# Patient Record
Sex: MALE | Race: WHITE | Employment: UNEMPLOYED | URBAN - METROPOLITAN AREA
[De-identification: names, ages, dates, MRNs, and addresses within clinical notes are randomized per-mention and may not be internally consistent; named-entity substitution may affect disease eponyms.]

---

## 2020-07-02 ENCOUNTER — HOSPITAL ENCOUNTER (OUTPATIENT)
Dept: PHYSICAL THERAPY | Age: 60
Discharge: HOME | End: 2020-07-02
Payer: COMMERCIAL

## 2020-07-02 PROCEDURE — 97530 THERAPEUTIC ACTIVITIES: CPT

## 2020-07-02 PROCEDURE — 97140 MANUAL THERAPY 1/> REGIONS: CPT

## 2020-07-02 PROCEDURE — 97162 PT EVAL MOD COMPLEX 30 MIN: CPT

## 2020-07-02 NOTE — PROGRESS NOTES
PHYSICAL THERAPY - DAILY TREATMENT NOTE    Patient Name: Agusto Darnell        Date: 2020  : 1960   YES Patient  Verified  Visit #:     Insurance: Payor: Eddie Armendarizure / Plan: [facility] PPO / Product Type: PPO /      In time: 4:50 P Out time: 5:45 P   Total Treatment Time: 50     BCBS/Medicare Time Tracking (below)   Total Timed Codes (min):  40 1:1 Treatment Time:  40     TREATMENT AREA =  Right shoulder pain [M25.511]  SUBJECTIVE  Pain Level (on 0 to 10 scale):  8  / 10   Medication Changes/New allergies or changes in medical history, any new surgeries or procedures?     NO    If yes, update Summary List   Subjective Functional Status/Changes:  []  No changes reported     See POC           Modalities Rationale:     decrease inflammation to improve patient's ability to return to pain-free sleeping in bed   min [] Estim, type/location:                                      []  att     []  unatt     []  w/US     []  w/ice    []  w/heat    min []  Mechanical Traction: type/lbs                   []  pro   []  sup   []  int   []  cont    []  before manual    []  after manual    min []  Ultrasound, settings/location:      min []  Iontophoresis w/ dexamethasone, location:                                               []  take home patch       []  in clinic   10 min [x]  Ice     []  Heat    location/position: Seated to L R shoulder     min []  Vasopneumatic Device, press/temp:     min []  Other:    [] Skin assessment post-treatment (if applicable):    [x]  intact    [x]  redness- no adverse reaction     []redness  adverse reaction:        15 min Therapeutic Activity: [x]  See sheet   Rationale:    increase strength to improve the patients ability to return to independent dressing    10 min Manual Therapy: PROM/gentle  Manual stretching for flex/scaption, [email or web address] of ABD in supine, oscillations for pain control    Rationale:      decrease pain and increase ROM to improve patient's ability to return to pain-free lifting overhead once DC use of sling         Billed With/As:   [] TE   [] TA   [] Neuro   [] Self Care Patient Education: [x] Review HEP    [] Progressed/Changed HEP based on:   [] positioning   [] body mechanics   [] transfers   [] heat/ice application  [] other:      Other Objective/Functional Measures:    Pt & wife educated on don/doffing sling, use of pendulums & pain-control, hygiene     Post Treatment Pain Level (on 0 to 10) scale:   8  / 10     ASSESSMENT  Assessment/Changes in Function:     See POC     []  See Progress Note/Recertification   Patient will continue to benefit from skilled PT services to modify and progress therapeutic interventions, address functional mobility deficits, address ROM deficits, analyze and cue movement patterns, analyze and modify body mechanics/ergonomics, assess and modify postural abnormalities and instruct in home and community integration to attain remaining goals.    Progress toward goals / Updated goals:    Progressing towards goals established at [address] [facility] [address]  []  Upgrade activities as tolerated YES Continue plan of care   []  Discharge due to :    []  Other:      Therapist: Mariella Claude, PT    Date: 7/2/2020 Time: 6:02 PM     Future Appointments   Date Time Provider Rafy Arce   7/6/2020  2:45 PM Tran Rigo [facility]   7/9/2020  4:45 PM Mariella Claude, PT [facility]   7/13/2020  2:45 PM Mariella Claude, PT [facility]   7/20/2020  2:45 PM Tran Sierra [facility]   7/23/2020  3:15 PM Mariella Claude, PT [facility]   7/27/2020  2:45 PM Mariella Claude, PT [facility]   7/30/2020  3:15 PM Batool Sharma, PT [facility]

## 2020-07-06 ENCOUNTER — HOSPITAL ENCOUNTER (OUTPATIENT)
Dept: PHYSICAL THERAPY | Age: 60
Discharge: HOME | End: 2020-07-06
Payer: COMMERCIAL

## 2020-07-06 PROCEDURE — 97140 MANUAL THERAPY 1/> REGIONS: CPT

## 2020-07-06 PROCEDURE — 97110 THERAPEUTIC EXERCISES: CPT

## 2020-07-06 NOTE — PROGRESS NOTES
PHYSICAL THERAPY - DAILY TREATMENT NOTE    Patient Name: Jay Chris        Date: 2020  : 1960   YES Patient  Verified  Visit #:      12  Insurance: Payor: Adrián Paul / Plan: [facility] PPO / Product Type: PPO /      In time: 2:50 P Out time: 3:40 P   Total Treatment Time: 50     BCBS/Medicare Time Tracking (below)   Total Timed Codes (min):  40 1:1 Treatment Time:  40     TREATMENT AREA =  Right shoulder pain [M25.511]    SUBJECTIVE  Pain Level (on 0 to 10 scale):  3  / 10   Medication Changes/New allergies or changes in medical history, any new surgeries or procedures? NO    If yes, update Summary List   Subjective Functional Status/Changes:  []  No changes reported     Patient reports he feels much better today, he is only taking 1 pill during the day & 1 pill before sleeping to manage his pain.             Modalities Rationale:     decrease edema, decrease inflammation and decrease pain to improve patient's ability to return to pain-free sleeping in bed   min [] Estim, type/location:                                      []  att     []  unatt     []  w/US     []  w/ice    []  w/heat    min []  Mechanical Traction: type/lbs                   []  pro   []  sup   []  int   []  cont    []  before manual    []  after manual    min []  Ultrasound, settings/location:      min []  Iontophoresis w/ dexamethasone, location:                                               []  take home patch       []  in clinic   10 min [x]  Ice     []  Heat    location/position: Seated to R shoulder     min []  Vasopneumatic Device, press/temp:     min []  Other:    [] Skin assessment post-treatment (if applicable):    [x]  intact    []  redness- no adverse reaction     []redness  adverse reaction:        25 min Therapeutic Exercise:  [x]  See flow sheet   Rationale:      increase ROM and increase strength to improve the patients ability to return to light gripping using R UE     15 min Manual Therapy: PROM for flex/scaption, [email or web address] of ABD in supine    Rationale:      decrease pain and increase ROM to improve patient's ability to return to pain-free ADLs     Billed With/As:   [] TE   [] TA   [] Neuro   [] Self Care Patient Education: [x] Review HEP    [] Progressed/Changed HEP based on:   [] positioning   [] body mechanics   [] transfers   [] heat/ice application    [] other:      Other Objective/Functional Measures:    Initiated therapeutic exercise per flow sheet     Post Treatment Pain Level (on 0 to 10) scale:   2  / 10     ASSESSMENT  Assessment/Changes in Function:     Improved tolerance to manual therapy, less muscle guarding     []  See Progress Note/Recertification   Patient will continue to benefit from skilled PT services to modify and progress therapeutic interventions, address functional mobility deficits, address ROM deficits, address strength deficits, analyze and modify body mechanics/ergonomics, assess and modify postural abnormalities and instruct in home and community integration to attain remaining goals.    Progress toward goals / Updated goals:    Progressing towards goals established at [address] [facility] [address] [address]  []  Upgrade activities as tolerated YES Continue plan of care   []  Discharge due to :    []  Other:      Therapist: Marc Joseph PT    Date: 7/6/2020 Time: 4:18 PM     Future Appointments   Date Time Provider Rafy Arce   7/9/2020  4:45 PM Jess [facility]   7/13/2020  2:45 PM Alma Gasca, PT [facility]   7/20/2020  2:45 PM Jess [facility]   7/23/2020  3:15 PM Alma Gasca, PT [facility]   7/27/2020  2:45 PM Alma Gasca, PT [facility]   7/30/2020  3:15 PM Danilo Sharma, PT [facility]

## 2020-07-09 ENCOUNTER — HOSPITAL ENCOUNTER (OUTPATIENT)
Dept: PHYSICAL THERAPY | Age: 60
Discharge: HOME | End: 2020-07-09
Payer: COMMERCIAL

## 2020-07-09 PROCEDURE — 97140 MANUAL THERAPY 1/> REGIONS: CPT

## 2020-07-09 PROCEDURE — 97110 THERAPEUTIC EXERCISES: CPT

## 2020-07-09 NOTE — PROGRESS NOTES
PHYSICAL THERAPY - DAILY TREATMENT NOTE    Patient Name: Shira Jaffe        Date: 2020  : 1960   YES Patient  Verified  Visit #:   3   of   12  Insurance: Payor: Mg Kelly / Plan: [facility] PPO / Product Type: PPO /      In time: 4:45 P Out time: 5:40 P   Total Treatment Time: 50     BCBS/Medicare Time Tracking (below)   Total Timed Codes (min):  40 1:1 Treatment Time:  40     TREATMENT AREA =  Right shoulder pain [M25.511]    SUBJECTIVE  Pain Level (on 0 to 10 scale):  3  / 10   Medication Changes/New allergies or changes in medical history, any new surgeries or procedures? NO    If yes, update Summary List   Subjective Functional Status/Changes:  []  No changes reported     Patient reports his arm is sore he tried to push a door using his R arm but it was in the sling & he realized he should not do that.          Modalities Rationale:     decrease edema, decrease inflammation and decrease pain to improve patient's ability to return to pain-free ADLs    min [] Estim, type/location:                                      []  att     []  unatt     []  w/US     []  w/ice    []  w/heat    min []  Mechanical Traction: type/lbs                   []  pro   []  sup   []  int   []  cont    []  before manual    []  after manual    min []  Ultrasound, settings/location:      min []  Iontophoresis w/ dexamethasone, location:                                               []  take home patch       []  in clinic   10 min [x]  Ice     []  Heat    location/position: Seated to R shoulder     min []  Vasopneumatic Device, press/temp:     min []  Other:    [] Skin assessment post-treatment (if applicable):    [x]  intact    [x]  redness- no adverse reaction     []redness  adverse reaction:        25 min Therapeutic Exercise:  [x]  See flow sheet   Rationale:      increase ROM and increase strength to improve the patients ability to return to light gripping using R UE     15 min Manual Therapy: PROM for flex/scaption, [email or web address] of ABD in supine    Rationale:      decrease pain and increase ROM to improve patient's ability to return to pain-free ADLs     Billed With/As:   [] TE   [] TA   [] Neuro   [] Self Care Patient Education: [x] Review HEP    [] Progressed/Changed HEP based on:   [] positioning   [] body mechanics   [] transfers   [] heat/ice application    [] other:      Other Objective/Functional Measures:    PROM: flex/scaption: 130 in supine   Post Treatment Pain Level (on 0 to 10) scale:   2  / 10     ASSESSMENT  Assessment/Changes in Function:     Improved tolerance to manual therapy, specifically with ER today     []  See Progress Note/Recertification   Patient will continue to benefit from skilled PT services to modify and progress therapeutic interventions, address functional mobility deficits, address ROM deficits, address strength deficits, analyze and modify body mechanics/ergonomics, assess and modify postural abnormalities and instruct in home and community integration to attain remaining goals.    Progress toward goals / Updated goals:    Progressing towards STG 2, LTG 2     PLAN  []  Upgrade activities as tolerated YES Continue plan of care   []  Discharge due to :    []  Other:      Therapist: Javy Cheng, PT    Date: 7/9/2020 Time: 4:18 PM     Future Appointments   Date Time Provider Rafy Arce   7/13/2020  2:45 PM Rajesh [facility]   7/20/2020  2:45 PM Jorge Ness, PT [facility]   7/23/2020  3:15 PM Jorge Ness PT [facility]   7/27/2020  2:45 PM Jorge Ness PT [facility]   7/30/2020  3:15 PM Linda Sharma, PT [facility]

## 2020-07-13 ENCOUNTER — APPOINTMENT (OUTPATIENT)
Dept: PHYSICAL THERAPY | Age: 60
End: 2020-07-13
Payer: COMMERCIAL

## 2020-07-14 ENCOUNTER — HOSPITAL ENCOUNTER (OUTPATIENT)
Dept: PHYSICAL THERAPY | Age: 60
Discharge: HOME | End: 2020-07-14
Payer: COMMERCIAL

## 2020-07-14 PROCEDURE — 97110 THERAPEUTIC EXERCISES: CPT

## 2020-07-14 PROCEDURE — 97140 MANUAL THERAPY 1/> REGIONS: CPT

## 2020-07-15 ENCOUNTER — APPOINTMENT (OUTPATIENT)
Dept: PHYSICAL THERAPY | Age: 60
End: 2020-07-15
Payer: COMMERCIAL

## 2020-07-15 NOTE — PROGRESS NOTES
[address] [facility] PHYSICAL THERAPY AT [address] [place] [address]  Blair Cantu [facility], [address] [place] [phone], [address]  Phone: [phone]  Fax: [phone]  PROGRESS NOTE  Patient Name: Sisi Bowles : 1960   Treatment/Medical Diagnosis: Right shoulder pain [M25.511]   Referral Source: Lashawn Adamson*     Date of Initial Visit: 20 Attended Visits: 4 Missed Visits: 0     SUMMARY OF TREATMENT  Therapeutic exercise including ROM, stretching,  strengthening, c/s stretching, postural ed, patient education, HEP instruction, CP, manual therapy including PROM/oscillations for pain control to promote relaxation. .    CURRENT STATUS  Mr. Dom Russo has made good progress with R shoulder,he is currently 2 weeks post op with good compliance with activity restrictions/use of sling & attendance of PT f/u visits. PROM improvements as follows: flex/scaption: 135 degrees, [email or web address] of ABD at 35 degrees in supine, full pain-free PROM of elbow. Good tolerance to current program. Plan to DC use of sling at 4 weeks post op, 20 as tolerated. Goal/Measure of Progress Goal Met? 1.  Establish HEP to prevent further disability. Status at last Eval: NA Current Status: Good compliance with daily HEP yes   2. Patient will report decreased c/o pain to < or = 3-4/10 to facilitate sleeping in bed with manageable sx in R shoulder. Status at last Eval: Average pain 8/10  At worst 9/10   At best 2/10  Current Status: Average pain 2-3/10  At worst 5/10  At best 0/10 Partially met   3. Improve gross  to > or = 80 lbs so strength is available for eventual lifting. Status at last Eval: 74 lbs Current Status: 95 lbs, pain-free yes     New Goals to be achieved in __3-4__  weeks:  1. Improve FOTO score from 30 points to > or = 58 points indicating improved tolerance with ADLs in regards to R shoulder.    2.  Improve R shoulder AROM for flex/scaption to 120 degrees so ROM is available for overhead reaching. 3.  Improve overall strength of R shoulder to 3+/5 so strength is available for return to light lifting activities at work/home. 4.  Patient will report decreased c/o pain to < or = 1-2/10 to facilitate sleeping at night uninterrupted with manageable sx in R shoulder. RECOMMENDATIONS  Patient to continue with PT for up to 3-4 more weeks in order to progress towards achieving all LTGs. If you have any questions/comments please contact us directly at [phone]. Thank you for allowing us to assist in the care of your patient. Therapist Signature: JOE Bolton, cert MDT Date: 6/89/1574     Time: 4:47 AM   NOTE TO PHYSICIAN:  PLEASE COMPLETE THE ORDERS BELOW AND FAX TO   [facility]: [phone]. If you are unable to process this request in 24 hours please contact our office: [phone]      ___ I have read the above report and request that my patient continue as recommended.   ___ I have read the above report and request that my patient continue therapy with the following changes/special instructions:_________________________________________________________   ___ I have read the above report and request that my patient be discharged from therapy.      Physician Signature:        Date:       Time:

## 2020-07-15 NOTE — PROGRESS NOTES
PHYSICAL THERAPY - DAILY TREATMENT NOTE    Patient Name: Dona Amor        Date: 2020  : 1960   YES Patient  Verified  Visit #:     Insurance: Payor: Mary Jane Nathan / Plan: [facility] PPO / Product Type: PPO /      In time: 4 P Out time: 4:55 P   Total Treatment Time: 50     BCBS/Medicare Time Tracking (below)   Total Timed Codes (min):  40 1:1 Treatment Time:  40     TREATMENT AREA =  Right shoulder pain [M25.511]    SUBJECTIVE  Pain Level (on 0 to 10 scale):  2  / 10   Medication Changes/New allergies or changes in medical history, any new surgeries or procedures?     NO    If yes, update Summary List   Subjective Functional Status/Changes:  []  No changes reported     See progress note to MD           Modalities Rationale:     decrease edema, decrease inflammation and decrease pain to improve patient's ability to return to pain-free use of R shoulder   min [] Estim, type/location:                                      []  att     []  unatt     []  w/US     []  w/ice    []  w/heat    min []  Mechanical Traction: type/lbs                   []  pro   []  sup   []  int   []  cont    []  before manual    []  after manual    min []  Ultrasound, settings/location:      min []  Iontophoresis w/ dexamethasone, location:                                               []  take home patch       []  in clinic   10 min [x]  Ice     []  Heat    location/position: Seated to R shoulder    min []  Vasopneumatic Device, press/temp:     min []  Other:    [] Skin assessment post-treatment (if applicable):    [x]  intact    [x]  redness- no adverse reaction     []redness  adverse reaction:        25 min Therapeutic Exercise:  [x]  See flow sheet   Rationale:      increase strength to improve the patients ability to return to eventual lifting     15 min Manual Therapy: PROM for flex/scaption, [email or web address] of ABD in supine   Rationale:      decrease pain and increase ROM to improve patient's ability to return to pain-free use of R shoulder once DC use of sling    Billed With/As:   [] TE   [] TA   [] Neuro   [] Self Care Patient Education: [x] Review HEP    [] Progressed/Changed HEP based on:   [] positioning   [] body mechanics   [] transfers   [] heat/ice application    [] other:      Other Objective/Functional Measures:    Gross  on R 95 lbs   PROM: flex/scaption 135 deg in supine, [email or web address] of ABD: 35 degrees     Post Treatment Pain Level (on 0 to 10) scale:   1  / 10     ASSESSMENT  Assessment/Changes in Function:     Good progress with PROM improvements as indicated above      []  See Progress Note/Recertification   Patient will continue to benefit from skilled PT services to modify and progress therapeutic interventions, address functional mobility deficits, address ROM deficits, analyze and modify body mechanics/ergonomics, assess and modify postural abnormalities and instruct in home and community integration to attain remaining goals.    Progress toward goals / Updated goals:    Progressing towards LTG s & STG 1, 3 met, 2 partially met     PLAN  []  Upgrade activities as tolerated YES Continue plan of care   []  Discharge due to :    [x]  Other: Progress note to MD     Therapist: Alexandra Gamino, PT    Date: 7/14/2020 Time: 4:55 PM     Future Appointments   Date Time Provider Rafy Arce   7/20/2020  2:45 [facility]   7/23/2020  3:15 PM Yamilet Crespo, PT [facility]   7/27/2020  2:45 PM Yamilet Crespo, PT [facility]   7/30/2020  3:15 PM Roberta Sharma, PT [facility]

## 2020-07-20 ENCOUNTER — HOSPITAL ENCOUNTER (OUTPATIENT)
Dept: PHYSICAL THERAPY | Age: 60
Discharge: HOME | End: 2020-07-20
Payer: COMMERCIAL

## 2020-07-20 PROCEDURE — 97140 MANUAL THERAPY 1/> REGIONS: CPT

## 2020-07-20 PROCEDURE — 97110 THERAPEUTIC EXERCISES: CPT

## 2020-07-20 NOTE — PROGRESS NOTES
[facility] PHYSICAL THERAPY - DAILY TREATMENT NOTE    Patient Name: Sisi Bowles        Date: 2020  : 1960   YES Patient  Verified  Visit #:   2   of   12  Insurance: Payor: Enrike Caban / Plan: [facility] PPO / Product Type: PPO /      In time: 2:50 P Out time: 3:40 P   Total Treatment Time: 50     BCBS/Medicare Time Tracking (below)   Total Timed Codes (min):  40 1:1 Treatment Time:  40     TREATMENT AREA =  Right shoulder pain [M25.511]    SUBJECTIVE  Pain Level (on 0 to 10 scale):  3  / 10   Medication Changes/New allergies or changes in medical history, any new surgeries or procedures? NO    If yes, update Summary List   Subjective Functional Status/Changes:  []  No changes reported     Patient reports that he is doing well, no problems over the weekend.             Modalities Rationale:     decrease edema, decrease inflammation and decrease pain to improve patient's ability to return to pain-free lifting    min [] Estim, type/location:                                      []  att     []  unatt     []  w/US     []  w/ice    []  w/heat    min []  Mechanical Traction: type/lbs                   []  pro   []  sup   []  int   []  cont    []  before manual    []  after manual    min []  Ultrasound, settings/location:      min []  Iontophoresis w/ dexamethasone, location:                                               []  take home patch       []  in clinic   10 min [x]  Ice     []  Heat    location/position: Seated to L shoulder     min []  Vasopneumatic Device, press/temp:     min []  Other:    [] Skin assessment post-treatment (if applicable):    [x]  intact    [x]  redness- no adverse reaction     []redness  adverse reaction:        25 min Therapeutic Exercise:  [x]  See flow sheet   Rationale:      increase ROM and increase strength to improve the patients ability to return to eventual lifting     15 min Manual Therapy: PROM/gentle manual stretch for flex/scaption in supine Rationale:      decrease pain and increase ROM to improve patient's ability to return to overhead reaching    Billed With/As:   [] TE   [] TA   [] Neuro   [] Self Care Patient Education: [x] Review HEP    [] Progressed/Changed HEP based on:   [] positioning   [] body mechanics   [] transfers   [] heat/ice application    [] other:      Other Objective/Functional Measures:    Progressed to green gripper today     Post Treatment Pain level (on 0 to 10) scale:   0  / 10     ASSESSMENT  Assessment/Changes in Function:     Good tolerance to today's strength progressions & manual therapy      []  See Progress Note/Recertification   Patient will continue to benefit from skilled PT services to modify and progress therapeutic interventions, address functional mobility deficits, address ROM deficits, address strength deficits, analyze and modify body mechanics/ergonomics, assess and modify postural abnormalities and instruct in home and community integration to attain remaining goals.    Progress toward goals / Updated goals:    Progressing towards newly established LTGs     PLAN  []  Upgrade activities as tolerated YES Continue plan of care   []  Discharge due to :    []  Other:      Therapist: Yossi Gerber PT    Date: 7/20/2020 Time: 3:51 PM     Future Appointments   Date Time Provider Rafy Arce   7/23/2020  3:15 [facility]   7/27/2020  2:45 PM Prince Ho, PT [facility]   7/30/2020  3:15 PM Erma Sharma, PT [facility]

## 2020-07-23 ENCOUNTER — HOSPITAL ENCOUNTER (OUTPATIENT)
Dept: PHYSICAL THERAPY | Age: 60
Discharge: HOME | End: 2020-07-23
Payer: COMMERCIAL

## 2020-07-23 PROCEDURE — 97140 MANUAL THERAPY 1/> REGIONS: CPT

## 2020-07-23 PROCEDURE — 97110 THERAPEUTIC EXERCISES: CPT

## 2020-07-23 NOTE — PROGRESS NOTES
PHYSICAL THERAPY - DAILY TREATMENT NOTE    Patient Name: Josefina Pimentel        Date: 2020  : 1960   YES Patient  Verified  Visit #:     Insurance: Payor: Loura Kussmaul / Plan: [facility] PPO / Product Type: PPO /      In time: 3:15 P Out time: 4:15 P   Total Treatment Time: 55     BCBS/Medicare Time Tracking (below)   Total Timed Codes (min):  45 1:1 Treatment Time:  45     TREATMENT AREA =  Right shoulder pain [M25.511]    SUBJECTIVE  Pain Level (on 0 to 10 scale):  2-3  / 10   Medication Changes/New allergies or changes in medical history, any new surgeries or procedures? NO    If yes, update Summary List   Subjective Functional Status/Changes:  []  No changes reported     Patient reports this his R shoulder has been doing well, his  Lower back has been hurting less.             Modalities Rationale:     decrease edema, decrease inflammation and decrease pain to improve patient's ability to return to pain-free sleeping at night.    min [] Estim, type/location:                                      []  att     []  unatt     []  w/US     []  w/ice    []  w/heat    min []  Mechanical Traction: type/lbs                   []  pro   []  sup   []  int   []  cont    []  before manual    []  after manual    min []  Ultrasound, settings/location:      min []  Iontophoresis w/ dexamethasone, location:                                               []  take home patch       []  in clinic   10 min [x]  Ice     []  Heat    location/position: Seated to R shoulder     min []  Vasopneumatic Device, press/temp:     min []  Other:    [] Skin assessment post-treatment (if applicable):    [x]  intact    []  redness- no adverse reaction     []redness  adverse :        30 min Therapeutic Exercise:  [x]  See flow sheet   Rationale:      increase ROM and increase strength to improve the patients ability to return to pain-free use of R UE once DC use of sling     15 min Manual Therapy: Gentle PROM for flex/scaption, [email or web address] of ABD in supine   Rationale:      decrease pain and increase ROM to improve patient's ability to return to pain-free reaching overhead    Billed With/As:   [] TE   [] TA   [] Neuro   [] Self Care Patient Education: [x] Review HEP    [] Progressed/Changed HEP based on:   [] positioning   [] body mechanics   [] transfers   [] heat/ice application    [] other:      Other Objective/Functional Measures:    See flow sheet       Post Treatment Pain Level (on 0 to 10) scale:   2  / 10     ASSESSMENT  Assessment/Changes in Function:     Good tolerance to today's treatment, no increase in pain      []  See Progress Note/Recertification   Patient will continue to benefit from skilled PT services to modify and progress therapeutic interventions, address functional mobility deficits, address ROM deficits, address strength deficits, analyze and modify body mechanics/ergonomics, assess and modify postural abnormalities and instruct in home and community integration to attain remaining goals.    Progress toward goals / Updated goals:    Progressing towards LTG 2 & 4     PLAN  []  Upgrade activities as tolerated YES Continue plan of care   []  Discharge due to :    []  Other:      Therapist: Mike Munoz PT    Date: 7/23/2020 Time: 4:31 PM     Future Appointments   Date Time Provider Rafy Arce   7/27/2020  2:45 PM Rao Hardy, PT [facility] [place] [facility]   7/30/2020  3:15 PM Rao Hardy, PT [facility]   8/3/2020  3:15 PM Rao Hardy, PT [facility]   8/6/2020  3:15 PM Rao Hardy, PT [facility]   8/10/2020  3:15 PM Rao Hardy, PT [facility]   8/13/2020  3:15 PM Rao Hardy, PT [facility]   8/17/2020  3:15 PM Rao Hardy, PT [facility]   8/20/2020  3:15 PM Rao Hardy, PT [facility]   8/24/2020  3:15 PM Rao Hardy, PT [facility]   8/27/2020  3:15 PM Nina Sharma, PT [facility]

## 2020-07-27 ENCOUNTER — HOSPITAL ENCOUNTER (OUTPATIENT)
Dept: PHYSICAL THERAPY | Age: 60
Discharge: HOME | End: 2020-07-27
Payer: COMMERCIAL

## 2020-07-27 PROCEDURE — 97110 THERAPEUTIC EXERCISES: CPT

## 2020-07-27 PROCEDURE — 97140 MANUAL THERAPY 1/> REGIONS: CPT

## 2020-07-27 NOTE — PROGRESS NOTES
PHYSICAL THERAPY - DAILY TREATMENT NOTE    Patient Name: Chelle Davalos        Date: 2020  : 1960   YES Patient  Verified  Visit #:      12  Insurance: Payor: Adamarisne Sharri / Plan: Radha Jane [id number] PPO / Product Type: PPO /      In time: 2:45 P Out time: 3:40 P   Total Treatment Time: 55     BCBS/Medicare Time Tracking (below)   Total Timed Codes (min):  45 1:1 Treatment Time:  40     TREATMENT AREA =  Right shoulder pain [M25.511]    SUBJECTIVE  Pain Level (on 0 to 10 scale):  2  / 10   Medication Changes/New allergies or changes in medical history, any new surgeries or procedures? NO    If yes, update Summary List   Subjective Functional Status/Changes:  []  No changes reported     Patient reports he tried to lift his R arm when he was out of the sling and had some pain.           Modalities Rationale:     decrease edema, decrease inflammation and decrease pain to improve patient's ability to return to pain-free sleeping at night.    min [] Estim, type/location:                                      []  att     []  unatt     []  w/US     []  w/ice    []  w/heat    min []  Mechanical Traction: type/lbs                   []  pro   []  sup   []  int   []  cont    []  before manual    []  after manual    min []  Ultrasound, settings/location:      min []  Iontophoresis w/ dexamethasone, location:                                               []  take home patch       []  in clinic   10 min [x]  Ice     []  Heat    location/position: Seated to R shoulder     min []  Vasopneumatic Device, press/temp:     min []  Other:    [] Skin assessment post-treatment (if applicable):    [x]  intact    [x]  redness- no adverse reaction     []redness  adverse :        30/  25 min Therapeutic Exercise:  [x]  See flow sheet   Rationale:      increase ROM and increase strength to improve the patients ability to return to pain-free use of R UE for lifting    15 min Manual Therapy: Gentle PROM for flex/scaption, Palila@[employer] of ABD in supine   Rationale:      decrease pain and increase ROM to improve patient's ability to return to pain-free dressing    Billed With/As:   [] TE   [] TA   [] Neuro   [] Self Care Patient Education: [x] Review HEP    [] Progressed/Changed HEP based on:   [] positioning   [] body mechanics   [] transfers   [] heat/ice application    [] other:      Other Objective/Functional Measures:    Patient 4 weeks post op on 7-28-20, patient educated on DC use of sling tomorrow       Post Treatment Pain Level (on 0 to 10) scale:   2  / 10     ASSESSMENT  Assessment/Changes in Function:     Good tolerance to today's treatment, no increase in pain with manual therapy     []  See Progress Note/Recertification   Patient will continue to benefit from skilled PT services to modify and progress therapeutic interventions, address functional mobility deficits, address ROM deficits, address strength deficits, analyze and modify body mechanics/ergonomics, assess and modify postural abnormalities and instruct in home and community integration to attain remaining goals.    Progress toward goals / Updated goals:    Progressing towards LTG 1, 2     PLAN  []  Upgrade activities as tolerated YES Continue plan of care   []  Discharge due to :    []  Other:      Therapist: Abby Real PT    Date: 7/27/2020 Time: 3:30 PM     Future Appointments   Date Time Provider Rafy Arce   7/30/2020  3:15 PM Axel Pop [facility]   8/3/2020  3:15 PM Vida Gone, PT [facility]   8/6/2020  3:15 PM Vida Gone, PT [facility]   8/10/2020  3:15 PM Vida Gone, PT [facility]   8/13/2020  3:15 PM Vida Gone, PT [facility]   8/17/2020  3:15 PM Vida Gone, PT [facility]   8/20/2020  3:15 PM Vida Gone, PT [facility]   8/24/2020  3:15 PM Vida Alvarez, PT [facility]   8/27/2020  3:15 PM Martell Sharma, PT [facility]

## 2020-07-30 ENCOUNTER — HOSPITAL ENCOUNTER (OUTPATIENT)
Dept: PHYSICAL THERAPY | Age: 60
Discharge: HOME | End: 2020-07-30
Payer: COMMERCIAL

## 2020-07-30 PROCEDURE — 97110 THERAPEUTIC EXERCISES: CPT

## 2020-07-30 PROCEDURE — 97140 MANUAL THERAPY 1/> REGIONS: CPT

## 2020-07-30 NOTE — PROGRESS NOTES
PHYSICAL THERAPY - DAILY TREATMENT NOTE    Patient Name: Chelle Davalos        Date: 2020  : 1960   YES Patient  Verified  Visit #:     Insurance: Payor: Linda Harrell / Plan: Radha Jane [id number] PPO / Product Type: PPO /      In time: 3:15 P Out time: 4:20 P   Total Treatment Time: 60     BCBS/Medicare Time Tracking (below)   Total Timed Codes (min):  50 1:1 Treatment Time:  45     TREATMENT AREA =  Right shoulder pain [M25.511]    SUBJECTIVE  Pain Level (on 0 to 10 scale):  1-2  / 10   Medication Changes/New allergies or changes in medical history, any new surgeries or procedures? NO    If yes, update Summary List   Subjective Functional Status/Changes:  []  No changes reported     Patient reports he DC use of sling yesterday & did well, he is trying to use his R arm more.             Modalities Rationale:     decrease edema, decrease inflammation and decrease pain to improve patient's ability to return to pain-free lifting using R UE   min [] Estim, type/location:                                      []  att     []  unatt     []  w/US     []  w/ice    []  w/heat    min []  Mechanical Traction: type/lbs                   []  pro   []  sup   []  int   []  cont    []  before manual    []  after manual    min []  Ultrasound, settings/location:      min []  Iontophoresis w/ dexamethasone, location:                                               []  take home patch       []  in clinic   10 min [x]  Ice     []  Heat    location/position: Seated to R shoulder     min []  Vasopneumatic Device, press/temp:     min []  Other:    [] Skin assessment post-treatment (if applicable):    [x]  intact    []  redness- no adverse reaction     []redness  adverse reaction:        40/  30 min Therapeutic Exercise:  [x]  See flow sheet   Rationale:      increase ROM and increase strength to improve the patients ability to return to independent dressing using R UE     10 min Manual Therapy: PROM/gentle manual stretching for flex/scaption, ER in supine at 75 of ABD   Rationale:      decrease pain and increase ROM to improve patient's ability to   Return to overhead reaching    Billed With/As:   [] TE   [] TA   [] Neuro   [] Self Care Patient Education: [x] Review HEP    [] Progressed/Changed HEP based on:   [] positioning   [] body mechanics   [] transfers   [] heat/ice application    [] other:      Other Objective/Functional Measures:    Progressed to AROM/AAROM per flow sheet, see updated HEP     Post Treatment Pain Level (on 0 to 10) scale:   1-2  / 10     ASSESSMENT  Assessment/Changes in Function:     Good tolerance with progression to AROM, improved tolerance to posterior capsule stretch in slight elevation in supine      []  See Progress Note/Recertification   Patient will continue to benefit from skilled PT services to modify and progress therapeutic interventions, address functional mobility deficits, address ROM deficits, address strength deficits, assess and modify postural abnormalities and instruct in home and community integration to attain remaining goals.    Progress toward goals / Updated goals:    Progressing towards LTG 2     PLAN  []  Upgrade activities as tolerated YES Continue plan of care   []  Discharge due to :    []  Other:      Therapist: Marc Joseph PT    Date: 7/30/2020 Time: 4:09 PM     Future Appointments   Date Time Provider Rafy Arce   8/3/2020  3:15 PM Jess Valle [facility] [place] [facility]   8/6/2020  3:15 PM Alma Gasca, PT [facility]   8/10/2020  3:15 PM Alma Gasca, PT [facility]   8/13/2020  3:15 PM Alma Alvarezing, PT [facility]   8/17/2020  3:15 PM Alma Alvarezing, PT [facility]   8/20/2020  3:15 PM Alma Alvarezing, PT [facility]   8/24/2020  3:15 PM Alma Gasca, PT [facility]   8/27/2020  3:15 PM Danilo Sharma, PT [facility]

## 2020-08-03 ENCOUNTER — HOSPITAL ENCOUNTER (OUTPATIENT)
Dept: PHYSICAL THERAPY | Age: 60
Discharge: HOME | End: 2020-08-03
Payer: COMMERCIAL

## 2020-08-03 PROCEDURE — 97140 MANUAL THERAPY 1/> REGIONS: CPT

## 2020-08-03 PROCEDURE — 97110 THERAPEUTIC EXERCISES: CPT

## 2020-08-03 NOTE — PROGRESS NOTES
PHYSICAL THERAPY - DAILY TREATMENT NOTE    Patient Name: Autumn Zhang        Date: 8/3/2020  : 1960   YES Patient  Verified  Visit #:     Insurance: Payor: Pk Dimas / Plan: Radha Jane [id number] PPO / Product Type: PPO /      In time: 3:15 P Out time: 4:25 P   Total Treatment Time: 65     BCBS/Medicare Time Tracking (below)   Total Timed Codes (min):  55 1:1 Treatment Time:  55     TREATMENT AREA =  Right shoulder pain [M25.511]    SUBJECTIVE  Pain Level (on 0 to 10 scale):  2  / 10   Medication Changes/New allergies or changes in medical history, any new surgeries or procedures? NO    If yes, update Summary List   Subjective Functional Status/Changes:  []  No changes reported     Patient reports he was sore after last visit, but not in pain today.             Modalities Rationale:     decrease edema, decrease inflammation and decrease pain to improve patient's ability to return to pain-free use of R shoulder with ADLs    min [] Estim, type/location:                                      []  att     []  unatt     []  w/US     []  w/ice    []  w/heat    min []  Mechanical Traction: type/lbs                   []  pro   []  sup   []  int   []  cont    []  before manual    []  after manual    min []  Ultrasound, settings/location:      min []  Iontophoresis w/ dexamethasone, location:                                               []  take home patch       []  in clinic   10 min [x]  Ice     []  Heat    location/position: Seated to R shoulder     min []  Vasopneumatic Device, press/temp:     min []  Other:    [] Skin assessment post-treatment (if applicable):    [x]  intact    [x]  redness- no adverse reaction     []redness  adverse reaction:        45 min Therapeutic Exercise:  [x]  See flow sheet   Rationale:      increase ROM and increase strength to improve the patients ability to return to pain-free lifting     10 min Manual Therapy: Gentle manual therapy including flex/scaption, [email or web address] of ABD in supine   Rationale:      decrease pain and increase ROM to improve patient's ability to return to pain-free reaching overhead    Billed With/As:   [] TE   [] TA   [] Neuro   [] Self Care Patient Education: [x] Review HEP    [] Progressed/Changed HEP based on:   [] positioning   [] body mechanics   [] transfers   [] heat/ice application    [] other:      Other Objective/Functional Measures:    See flow sheet, added sleeper stretch in R S/L today   Scaption: 108 degrees in standing      Post Treatment Pain Level (on 0 to 10) scale:   2  / 10     ASSESSMENT  Assessment/Changes in Function:     V/c & tactile cues to promote relaxation during manual therapy today      []  See Progress Note/Recertification   Patient will continue to benefit from skilled PT services to modify and progress therapeutic interventions, address functional mobility deficits, address ROM deficits, address strength deficits, analyze and modify body mechanics/ergonomics, assess and modify postural abnormalities and instruct in home and community integration to attain remaining goals.    Progress toward goals / Updated goals:    Progressing towards LTG 2      PLAN  []  Upgrade activities as tolerated YES Continue plan of care   []  Discharge due to :    []  Other:      Therapist: Ralf Jang, PT    Date: 8/3/2020 Time: 5:34 PM     Future Appointments   Date Time Provider Rafy Arce   8/6/2020  3:15 PM Dee [facility] [place] [facility]   8/10/2020  3:15 PM Abel Lang, PT [facility]   8/13/2020  3:15 PM Abel Rumps, PT [facility]   8/17/2020  3:15 PM Abel Rumps, PT [facility]   8/20/2020  3:15 PM Abel Rumps, PT [facility]   8/24/2020  3:15 PM Abel Rumps, PT [facility]   8/27/2020  3:15 PM Ricky Sharma, PT [facility]

## 2020-08-06 ENCOUNTER — APPOINTMENT (OUTPATIENT)
Dept: PHYSICAL THERAPY | Age: 60
End: 2020-08-06
Payer: COMMERCIAL

## 2020-08-10 ENCOUNTER — HOSPITAL ENCOUNTER (OUTPATIENT)
Dept: PHYSICAL THERAPY | Age: 60
Discharge: HOME | End: 2020-08-10
Payer: COMMERCIAL

## 2020-08-10 PROCEDURE — 97110 THERAPEUTIC EXERCISES: CPT

## 2020-08-10 PROCEDURE — 97140 MANUAL THERAPY 1/> REGIONS: CPT

## 2020-08-10 NOTE — PROGRESS NOTES
PHYSICAL THERAPY - DAILY TREATMENT NOTE    Patient Name: Anup Saldana        Date: 8/10/2020  : 1960   YES Patient  Verified  Visit #:     Insurance: Payor: Vicky Paz / Plan: Radha Jane [id number] PPO / Product Type: PPO /      In time: 3:10 P Out time: 4:05 P   Total Treatment Time: 50     BCBS/Medicare Time Tracking (below)   Total Timed Codes (min):  50 1:1 Treatment Time:  40     TREATMENT AREA = Right shoulder pain [M25.511]    SUBJECTIVE  Pain Level (on 0 to 10 scale):  4  / 10   Medication Changes/New allergies or changes in medical history, any new surgeries or procedures?     NO    If yes, update Summary List   Subjective Functional Status/Changes:  []  No changes reported     See progress note to MD           Modalities Rationale:     decrease edema, decrease inflammation and decrease pain to improve patient's ability to return to pain-free lifting using to R UE   min [] Estim, type/location:                                      []  att     []  unatt     []  w/US     []  w/ice    []  w/heat    min []  Mechanical Traction: type/lbs                   []  pro   []  sup   []  int   []  cont    []  before manual    []  after manual    min []  Ultrasound, settings/location:      min []  Iontophoresis w/ dexamethasone, location:                                               []  take home patch       []  in clinic   10 min [x]  Ice     []  Heat    location/position: Seated to R shoulder     min []  Vasopneumatic Device, press/temp:     min []  Other:    [] Skin assessment post-treatment (if applicable):    [x]  intact    []  redness- no adverse reaction     []redness  adverse reaction:        25 min Therapeutic Exercise:  [x]  See flow sheet   Rationale:      increase ROM and increase strength to improve the patients ability to return to pain-free lifting     15 min Manual Therapy: Gentle PROM/manual stretch for flex/scaption, ER in supine   Rationale:      decrease pain, increase ROM and increase tissue extensibility to improve patient's ability to return to pain-free reaching overhead      Billed With/As:   [] TE   [] TA   [] Neuro   [] Self Care Patient Education: [x] Review HEP    [] Progressed/Changed HEP based on:   [] positioning   [] body mechanics   [] transfers   [] heat/ice application    [] other:      Other Objective/Functional Measures:    Patient unable to elevate R UE > ~ 100 degrees today  Modified therapeutic exercise per flow sheet    Post Treatment Pain Level (on 0 to 10) scale:   2  / 10     ASSESSMENT  Assessment/Changes in Function:     No increase in pain with PROM, although painful with AROM/AAROM     []  See Progress Note/Recertification   Patient will continue to benefit from skilled PT services to modify and progress therapeutic interventions, address functional mobility deficits, address ROM deficits, address strength deficits, analyze and modify body mechanics/ergonomics, assess and modify postural abnormalities, address imbalance/dizziness and instruct in home and community integration to attain remaining goals.    Progress toward goals / Updated goals:    Progressing towards LTGs      PLAN  []  Upgrade activities as tolerated YES Continue plan of care   []  Discharge due to :    [x]  Other: See progress note to MD, f/u with MD on 8/12/20     Therapist: Mike Munoz PT    Date: 8/10/2020 Time: 4:16 PM     Future Appointments   Date Time Provider Rafy Arce   8/13/2020  3:15 PM Rao Hardy, PT [facility]   8/17/2020  3:15 PM Rao Hardy, PT [facility]   8/20/2020  3:15 PM Julianne Root [facility]   8/24/2020  3:15 PM Rao Hardy, PT [facility]   8/27/2020  3:15 PM Nina Sharma, PT [facility]

## 2020-08-10 NOTE — PROGRESS NOTES
[address] [facility] PHYSICAL THERAPY AT [address] [place] [address]  [facility], [address] ,[address], [address]  Phone: [phone]  Fax: [phone]  PROGRESS NOTE  Patient Name: Rohit Hicks : 1960   Treatment/Medical Diagnosis: Right shoulder pain [M25.511]   Referral Source: Tay Olivarez*     Date of Initial Visit: 20 Attended Visits: 10 Missed Visits: 1     SUMMARY OF TREATMENT  Therapeutic exercise including ROM, stretching,  strengthening, c/s stretching, postural ed, patient education, HEP instruction, CP, manual therapy including PROM/oscillations for pain control to promote relaxation. .    CURRENT STATUS  Mr. Ezra Navarro was making good progress with current treatment & had reported intermittent pain with average pain level 1-2/10. Today, he presents with increased pain levels after lifting a chair to stack onto a set of chairs with immediate onset of anterior shoulder pain with some radiating pain into upper arm. This pain was not present prior to today's treatment. Prior to today he was managing his pain with tylenol prn & reported primary c/o at night when attempting to find position of comfort. Goal/Measure of Progress Goal Met? 1. Improve R shoulder AROM for flex/scaption to 120 degrees so ROM is available for overhead reaching. Status at last Eval: Not assessed Current Status: 108 degrees  progressing   2. Patient will report decreased c/o pain to < or = 1-2/10 to facilitate sleeping at night uninterrupted with manageable sx in R shoulder. Status at last Eval: Average pain 2-3/10  At worst 5/10  At best 0/10 Current Status: Increased pain level today, 7/10 progressing   3. Improve FOTO score from 30 points to > or = 58 points indicating improved tolerance with ADLs in regards to R shoulder. Status at last Eval: 30 Current Status: To be assessed  progressing     New Goals to be achieved in __3-4__  weeks:  1.   Improve FOTO score from 30 points to > or = 58 points indicating improved tolerance with ADLs in regards to R shoulder. 2.  Improve R shoulder AROM for flex/scaption to 120 degrees so ROM is available for overhead reaching. 3.  Improve overall strength of R shoulder to 3+/5 so strength is available for return to light lifting activities at work/home. 4.  Patient will report decreased c/o pain to < or = 1-2/10 to facilitate sleeping at night uninterrupted with manageable sx in R shoulder. RECOMMENDATIONS  Patient to continue with PT for up to 3-4 more weeks in order to progress towards achieving all LTGs. Please indicate your recommendation regarding recent increase in pain & any changes to current treatment. If you have any questions/comments please contact us directly at [phone]. Thank you for allowing us to assist in the care of your patient. Therapist Signature: JOE Chery, cert MDT Date: 9/09/7845     Time: 3:10 PM   NOTE TO PHYSICIAN:  PLEASE COMPLETE THE ORDERS BELOW AND FAX TO   [facility] [facility]: [phone]. If you are unable to process this request in 24 hours please contact our office: [phone]      ___ I have read the above report and request that my patient continue as recommended.   ___ I have read the above report and request that my patient continue therapy with the following changes/special instructions:_________________________________________________________   ___ I have read the above report and request that my patient be discharged from therapy.      Physician Signature:        Date:       Time:

## 2020-08-13 ENCOUNTER — HOSPITAL ENCOUNTER (OUTPATIENT)
Dept: PHYSICAL THERAPY | Age: 60
Discharge: HOME | End: 2020-08-13
Payer: COMMERCIAL

## 2020-08-13 PROCEDURE — 97140 MANUAL THERAPY 1/> REGIONS: CPT

## 2020-08-13 PROCEDURE — 97110 THERAPEUTIC EXERCISES: CPT

## 2020-08-13 NOTE — PROGRESS NOTES
PHYSICAL THERAPY - DAILY TREATMENT NOTE    Patient Name: Sisi Bowles        Date: 2020   : 1960   YES Patient  Verified  Visit #:   10  of   12  Insurance: Payor: Enrike Caban / Plan: [facility] PPO / Product Type: PPO /      In time: 3:15 P Out time: 4:15 P   Total Treatment Time: 55     BCBS/Medicare Time Tracking (below)   Total Timed Codes (min):  45 1:1 Treatment Time:  45     TREATMENT AREA = Right shoulder pain [M25.511]    SUBJECTIVE  Pain Level (on 0 to 10 scale):  3  / 10   Medication Changes/New allergies or changes in medical history, any new surgeries or procedures? YES  If yes, update Summary List   Subjective Functional Status/Changes:  []  No changes reported     Patient reports f/u with MD went well, they checked out his arm & assured him he has not had any injury to his shoulder, they gave him mobic (1x/day) for the next 2 weeks to help with the inflammation.            Modalities Rationale:     decrease edema, decrease inflammation and decrease pain to improve patient's ability to return to pain-free lifting using R UE   min [] Estim, type/location:                                      []  att     []  unatt     []  w/US     []  w/ice    []  w/heat    min []  Mechanical Traction: type/lbs                   []  pro   []  sup   []  int   []  cont    []  before manual    []  after manual    min []  Ultrasound, settings/location:      min []  Iontophoresis w/ dexamethasone, location:                                               []  take home patch       []  in clinic   10 min [x]  Ice     []  Heat    location/position: Seated to R shoulder     min []  Vasopneumatic Device, press/temp:     min []  Other:    [] Skin assessment post-treatment (if applicable):    [x]  intact    []  redness- no adverse reaction     []redness  adverse reaction:        35 min Therapeutic Exercise:  [x]  See flow sheet   Rationale:      increase ROM and increase strength to improve the patients ability to return to pain-free reaching overhead     10 min Manual Therapy: Gentle manual stretch for flex/scaption, ER in supine   Rationale:      decrease pain and increase ROM to improve patient's ability to return to pain-free lifting      Billed With/As:   [] TE   [] TA   [] Neuro   [] Self Care Patient Education: [x] Review HEP    [] Progressed/Changed HEP based on:   [] positioning   [] body mechanics   [] transfers   [] heat/ice application    [] other:      Other Objective/Functional Measures:    Flex/scaption; 128 degrees in standing     Post Treatment Pain Level (on 0 to 10) scale:   2 / 10     ASSESSMENT  Assessment/Changes in Function:     Resumed all exercise per flow sheet, improved tolerance to all active movements today     []  See Progress Note/Recertification   Patient will continue to benefit from skilled PT services to modify and progress therapeutic interventions, address functional mobility deficits, address ROM deficits, address strength deficits, analyze and modify body mechanics/ergonomics, assess and modify postural abnormalities and instruct in home and community integration to attain remaining goals.    Progress toward goals / Updated goals:    Progressing towards newly established LTGs      PLAN  []  Upgrade activities as tolerated YES Continue plan of care   []  Discharge due to :    []  Other:      Therapist: Devin Pettit PT    Date: 8/13/2020 Time: 4:15 PM     Future Appointments   Date Time Provider Rafy Arce   8/17/2020  3:15 PM [facility]anner [facility]   8/20/2020  3:15 PM Gisel Cummings, PT [facility]   8/24/2020  3:15 PM Gisel Cummings PT [facility]   8/27/2020  3:15 PM Reshma Sharma, PT [facility]

## 2020-08-17 ENCOUNTER — HOSPITAL ENCOUNTER (OUTPATIENT)
Dept: PHYSICAL THERAPY | Age: 60
Discharge: HOME | End: 2020-08-17
Payer: COMMERCIAL

## 2020-08-17 PROCEDURE — 97140 MANUAL THERAPY 1/> REGIONS: CPT

## 2020-08-17 PROCEDURE — 97110 THERAPEUTIC EXERCISES: CPT

## 2020-08-17 NOTE — PROGRESS NOTES
PHYSICAL THERAPY - DAILY TREATMENT NOTE    Patient Name: Jay Chris        Date: 2020   : 1960   YES Patient  Verified  Visit #:     Insurance: Payor: Adrián Paul / Plan: [facility] PPO / Product Type: PPO /      In time: 3:15 P Out time: 4:10 P   Total Treatment Time: 50     BCBS/Medicare Time Tracking (below)   Total Timed Codes (min):  40 1:1 Treatment Time:  40     TREATMENT AREA = Right shoulder pain [M25.511]    SUBJECTIVE  Pain Level (on 0 to 10 scale):  2  / 10   Medication Changes/New allergies or changes in medical history, any new surgeries or procedures? YES  If yes, update Summary List   Subjective Functional Status/Changes:  []  No changes reported     Patient reports his arm is feeling better & he can now raise over his head with less pain.            Modalities Rationale:     decrease edema, decrease inflammation and decrease pain to improve patient's ability to return to pain-free lifting using R UE   min [] Estim, type/location:                                      []  att     []  unatt     []  w/US     []  w/ice    []  w/heat    min []  Mechanical Traction: type/lbs                   []  pro   []  sup   []  int   []  cont    []  before manual    []  after manual    min []  Ultrasound, settings/location:      min []  Iontophoresis w/ dexamethasone, location:                                               []  take home patch       []  in clinic   10 min [x]  Ice     []  Heat    location/position: Seated to R shoulder     min []  Vasopneumatic Device, press/temp:     min []  Other:    [] Skin assessment post-treatment (if applicable):    [x]  intact    []  redness- no adverse reaction     []redness  adverse reaction:        30 min Therapeutic Exercise:  [x]  See flow sheet   Rationale:      increase ROM and increase strength to improve the patients ability to return to pain-free lifting    10 min Manual Therapy: Gentle manual stretch for R shoulder flex/scaption, [email or web address] of ABD in supine   Rationale:      decrease pain and increase ROM to improve patient's ability to return to pain-free lifting      Billed With/As:   [] TE   [] TA   [] Neuro   [] Self Care Patient Education: [x] Review HEP    [] Progressed/Changed HEP based on:   [] positioning   [] body mechanics   [] transfers   [] heat/ice application    [] other:      Other Objective/Functional Measures: Added dowel ext & IR in standing today  7 weeks post op on 8/18/20     Post Treatment Pain Level (on 0 to 10) scale:   0  / 10     ASSESSMENT  Assessment/Changes in Function:     Resumed all exercise per flow sheet, improved tolerance to all active movements today     []  See Progress Note/Recertification   Patient will continue to benefit from skilled PT services to modify and progress therapeutic interventions, address functional mobility deficits, address ROM deficits, address strength deficits, analyze and modify body mechanics/ergonomics, assess and modify postural abnormalities and instruct in home and community integration to attain remaining goals.    Progress toward goals / Updated goals:    Progressing towards newly established LTGs      PLAN  []  Upgrade activities as tolerated YES Continue plan of care   []  Discharge due to :    []  Other:      Therapist: Kandi Grover, PT    Date: 8/17/2020 Time: 4:15 PM     Future Appointments   Date Time Provider Rafy Arce   8/20/2020  3:15 PM [facility]   8/24/2020  3:15 PM Nila Tomlinson, PT [facility]   8/27/2020  3:15 PM Eloisa Sharma, PT [facility]

## 2020-08-20 ENCOUNTER — HOSPITAL ENCOUNTER (OUTPATIENT)
Dept: PHYSICAL THERAPY | Age: 60
Discharge: HOME | End: 2020-08-20
Payer: COMMERCIAL

## 2020-08-20 PROCEDURE — 97140 MANUAL THERAPY 1/> REGIONS: CPT

## 2020-08-20 PROCEDURE — 97110 THERAPEUTIC EXERCISES: CPT

## 2020-08-20 NOTE — PROGRESS NOTES
PHYSICAL THERAPY - DAILY TREATMENT NOTE    Patient Name: Bernardo Basurto        Date: 2020  : 1960   YES Patient  Verified  Visit #:   15   of   18  Insurance: Payor: Nabil Acharya / Plan: [employer] [id number] PPO / Product Type: PPO /      In time: 3:15 P Out time: 4:10 P   Total Treatment Time: 50     BCBS/Medicare Time Tracking (below)   Total Timed Codes (min):  40 1:1 Treatment Time:  40     TREATMENT AREA = Right shoulder pain [M25.511]    SUBJECTIVE  Pain Level (on 0 to 10 scale):  4-5  / 10   Medication Changes/New allergies or changes in medical history, any new surgeries or procedures? NO    If yes, update Summary List   Subjective Functional Status/Changes:  []  No changes reported     Patient reports he noticed that he has a bruise down his upper arm, his arm is better overall but is still sore from the lifting incident.             Modalities Rationale:     decrease edema, decrease inflammation and decrease pain to improve patient's ability to return to pain-free lifting using R UE    min [] Estim, type/location:                                      []  att     []  unatt     []  w/US     []  w/ice    []  w/heat    min []  Mechanical Traction: type/lbs                   []  pro   []  sup   []  int   []  cont    []  before manual    []  after manual    min []  Ultrasound, settings/location:      min []  Iontophoresis w/ dexamethasone, location:                                               []  take home patch       []  in clinic   10 min [x]  Ice     []  Heat    location/position: Supine to l/s     min []  Vasopneumatic Device, press/temp:     min []  Other:    [] Skin assessment post-treatment (if applicable):    [x]  intact    []  redness- no adverse reaction     []redness  adverse reaction:        30 min Therapeutic Exercise:  [x]  See flow sheet   Rationale:      increase ROM and increase strength to improve the patients ability to return to pain-free reaching overhead using R UE      10 min Manual Therapy: Gentle manual stretch for flex/scaption, ER in supine   Rationale:      decrease pain and increase ROM to improve patient's ability to return to pain-free reaching into elevation for dressing      Billed With/As:   [] TE   [] TA   [] Neuro   [] Self Care Patient Education: [x] Review HEP    [] Progressed/Changed HEP based on:   [] positioning   [] body mechanics   [] transfers   [] heat/ice application    [] other:      Other Objective/Functional Measures:    Held IR stretch & posterior capsule stretch today due to pain      Post Treatment Pain Level (on 0 to 10) scale:   4-5  / 10     ASSESSMENT  Assessment/Changes in Function:     Mild c/o pain with end range self stretching as well ER with manual therapy     []  See Progress Note/Recertification   Patient will continue to benefit from skilled PT services to modify and progress therapeutic interventions, address functional mobility deficits, address ROM deficits, address strength deficits, analyze and modify body mechanics/ergonomics, assess and modify postural abnormalities and instruct in home and community integration to attain remaining goals.    Progress toward goals / Updated goals:    Progressing towards LTG 4     PLAN  []  Upgrade activities as tolerated YES Continue plan of care   []  Discharge due to :    []  Other:      Therapist: Leroy Latham PT    Date: 8/20/2020 Time: 4:39 PM     Future Appointments   Date Time Provider Rafy Arce   8/24/2020  3:15 PM [facility]   8/27/2020  3:15 PM Logan Cox, PT [facility]   9/1/2020  3:30 PM Logan Cox, PT [facility]   9/3/2020  3:15 PM Logan Cox, PT [facility]   9/8/2020  3:30 PM Sumandie Hudsone, PT [facility]   9/10/2020  2:45 PM Sumandie Hudsone, PT [facility]   9/15/2020  3:30 PM Deedeee Hudsone, PT [facility]   9/17/2020  3:15 PM Logan Cox PT [facility]   9/22/2020  3:30 PM Ismael Sharma Remigio Mcburney, PT [facility]   9/24/2020  3:15 PM Treasure Romo, PT [facility]   9/29/2020  3:30 PM Treasure Romo, PT [facility]   10/1/2020  3:15 PM New Sharma, PT [facility]

## 2020-08-24 ENCOUNTER — HOSPITAL ENCOUNTER (OUTPATIENT)
Dept: PHYSICAL THERAPY | Age: 60
Discharge: HOME | End: 2020-08-24
Payer: COMMERCIAL

## 2020-08-24 PROCEDURE — 97140 MANUAL THERAPY 1/> REGIONS: CPT

## 2020-08-24 PROCEDURE — 97110 THERAPEUTIC EXERCISES: CPT

## 2020-08-24 NOTE — PROGRESS NOTES
PHYSICAL THERAPY - DAILY TREATMENT NOTE    Patient Name: Autumn Zhang        Date: 2020  : 1960   YES Patient  Verified  Visit #:   15   of   18  Insurance: Payor: Pk Dimas / Plan: Radha Jane [id number] PPO / Product Type: PPO /      In time: 3:15 P Out time: 4:18 P   Total Treatment Time: 60     BCBS/Medicare Time Tracking (below)   Total Timed Codes (min):  50 1:1 Treatment Time:  50     TREATMENT AREA =  Right shoulder pain [M25.511]    SUBJECTIVE  Pain Level (on 0 to 10 scale):  3  / 10   Medication Changes/New allergies or changes in medical history, any new surgeries or procedures? NO    If yes, update Summary List   Subjective Functional Status/Changes:  []  No changes reported     Patient reports that his biceps does not hurt as much, it is still sore but not as bad as the last few weeks.             Modalities Rationale:     decrease edema, decrease inflammation and decrease pain to improve patient's ability to return to pain-free use of R shoulder with ADLs    min [] Estim, type/location:                                      []  att     []  unatt     []  w/US     []  w/ice    []  w/heat    min []  Mechanical Traction: type/lbs                   []  pro   []  sup   []  int   []  cont    []  before manual    []  after manual    min []  Ultrasound, settings/location:      min []  Iontophoresis w/ dexamethasone, location:                                               []  take home patch       []  in clinic   10 min [x]  Ice     []  Heat    location/position: Seated to R shoulder     min []  Vasopneumatic Device, press/temp:     min []  Other:    [] Skin assessment post-treatment (if applicable):    [x]  intact    [x]  redness- no adverse reaction     []redness  adverse reaction:        35   min Therapeutic Exercise:  [x]  See flow sheet   Rationale:      increase ROM and increase strength to improve the patients ability to return to pain-free lifting using R UE     15 min Manual Therapy: Gentle manual stretch for [email or web address] of ABD, flexion/scaption in supine, oscillations to promote relaxation    Rationale:      decrease pain and increase ROM to improve patient's ability to return to pain-free reaching overhead for dressing    Billed With/As:   [] TE   [] TA   [] Neuro   [] Self Care Patient Education: [x] Review HEP    [] Progressed/Changed HEP based on:   [] positioning   [] body mechanics   [] transfers   [] heat/ice application    [] other:      Other Objective/Functional Measures: Added supine protraction, resuming standing elevation per flow sheet  8 weeks post op on 8-25-20   Post Treatment Pain Level (on 0 to 10) scale:   3  / 10     ASSESSMENT  Assessment/Changes in Function:     Improved tolerance to elevation with elbow flexed, c/o pain in plane of scapula with arm fully extended      []  See Progress Note/Recertification   Patient will continue to benefit from skilled PT services to modify and progress therapeutic interventions, address functional mobility deficits, address ROM deficits, address strength deficits, analyze and modify body mechanics/ergonomics, assess and modify postural abnormalities and instruct in home and community integration to attain remaining goals.    Progress toward goals / Updated goals:    Progressing towards LTG 3      PLAN  []  Upgrade activities as tolerated YES Continue plan of care   []  Discharge due to :    []  Other:      Therapist: Zulema López PT    Date: 8/24/2020 Time: 5:05 PM     Future Appointments   Date Time Provider Rafy Arce   8/27/2020  3:15 PM Patricia Paez [facility] [place] [facility]   9/1/2020  3:30 PM Giovana Raza PT [facility]   9/3/2020  3:15 PM Giovana Raza PT [facility]   9/8/2020  3:30 PM Giovana Raza PT [facility]   9/10/2020  2:45 PM Giovana Raza PT [facility]   9/15/2020  3:30 PM Giovana Raza PT [facility]   9/17/2020  3:15 PM Giovana Raza PT [facility]   9/22/2020 3:30 PM Julianne Root [facility] [place] [facility]   9/24/2020  3:15 PM Rao Hardy, PT [facility]   9/29/2020  3:30 PM Rao Hardy, PT [facility]   10/1/2020  3:15 PM Nina Sharma, PT [facility]

## 2020-08-27 ENCOUNTER — HOSPITAL ENCOUNTER (OUTPATIENT)
Dept: PHYSICAL THERAPY | Age: 60
Discharge: HOME | End: 2020-08-27
Payer: COMMERCIAL

## 2020-08-27 PROCEDURE — 97110 THERAPEUTIC EXERCISES: CPT

## 2020-08-27 PROCEDURE — 97140 MANUAL THERAPY 1/> REGIONS: CPT

## 2020-08-27 NOTE — PROGRESS NOTES
PHYSICAL THERAPY - DAILY TREATMENT NOTE    Patient Name: Danis Sethi        Date: 2020  : 1960   YES Patient  Verified  Visit #:   15   of   18  Insurance: Payor: Tequila Score / Plan: Radha Jane [id number] PPO / Product Type: PPO /      In time: 3:15 P Out time: 4:20 P   Total Treatment Time: 60     BCBS/Medicare Time Tracking (below)   Total Timed Codes (min):  60 1:1 Treatment Time:  50     TREATMENT AREA = Right shoulder pain [M25.511]    SUBJECTIVE  Pain Level (on 0 to 10 scale):  2  / 10   Medication Changes/New allergies or changes in medical history, any new surgeries or procedures? NO    If yes, update Summary List   Subjective Functional Status/Changes:  []  No changes reported     Patient reports his biceps has been feeling better.            Modalities Rationale:     decrease edema and decrease pain to improve patient's ability to return to pain-free lifting with R UE   min [] Estim, type/location:                                      []  att     []  unatt     []  w/US     []  w/ice    []  w/heat    min []  Mechanical Traction: type/lbs                   []  pro   []  sup   []  int   []  cont    []  before manual    []  after manual     min []  Ultrasound, settings/location:      min []  Iontophoresis w/ dexamethasone, location:                                               []  take home patch       []  in clinic   10 min [x]  Ice     []  Heat    location/position: Seated to R shoulder     min []  Vasopneumatic Device, press/temp:     min []  Other:    [] Skin assessment post-treatment (if applicable):    []  intact    []  redness- no adverse reaction     []redness  adverse reaction:        35 min Therapeutic Exercise:  [x]  See flow sheet   Rationale:      increase ROM and increase strength to improve the patients ability to return to pain-free lying in R S/L position to sleep     15 min Manual Therapy: Manual stretch for flex/scaption, [email or web address] of ABD in supine   Rationale: decrease pain and increase ROM to improve patient's ability to return to reaching overhead for dressing      Billed With/As:   [] TE   [] TA   [] Neuro   [] Self Care Patient Education: [x] Review HEP    [] Progressed/Changed HEP based on:   [] positioning   [] body mechanics   [] transfers   [] heat/ice application    [] other:      Other Objective/Functional Measures:    See flow sheet     Post Treatment Pain Level (on 0 to 10) scale:   2  / 10     ASSESSMENT  Assessment/Changes in Function:     Resumed all therapeutic exercise without complaints, good tolerance to current program      []  See Progress Note/Recertification   Patient will continue to benefit from skilled PT services to modify and progress therapeutic interventions, address functional mobility deficits, address ROM deficits, address strength deficits, analyze and modify body mechanics/ergonomics, assess and modify postural abnormalities and instruct in home and community integration to attain remaining goals.    Progress toward goals / Updated goals:    Progressing towards LTG 1 & 2     PLAN  []  Upgrade activities as tolerated YES Continue plan of care   []  Discharge due to :    []  Other:      Therapist: Abby Real PT    Date: 8/27/2020 Time: 4:49 PM     Future Appointments   Date Time Provider Rafy Arce   9/1/2020  3:30 PM Vida Gone, PT [facility]   9/3/2020  3:15 PM Vida Gone, PT [facility]   9/8/2020  3:30 PM Vida Gone, PT [facility]   9/10/2020  2:45 PM Vida Gone, PT [facility]   9/15/2020  3:30 PM Vida Gone, PT [facility]   9/17/2020  3:15 PM Vida Gone, PT [facility]   9/22/2020  3:30 PM Vida Gone, PT [facility]   9/24/2020  3:15 PM Vida Gone, PT [facility]   9/29/2020  3:30 PM Vida Gone, PT [facility]   10/1/2020  3:15 PM Martell Sharma, PT [facility]

## 2020-09-01 ENCOUNTER — HOSPITAL ENCOUNTER (OUTPATIENT)
Dept: PHYSICAL THERAPY | Age: 60
Discharge: HOME | End: 2020-09-01
Payer: COMMERCIAL

## 2020-09-01 PROCEDURE — 97110 THERAPEUTIC EXERCISES: CPT

## 2020-09-01 PROCEDURE — 97140 MANUAL THERAPY 1/> REGIONS: CPT

## 2020-09-01 NOTE — PROGRESS NOTES
PHYSICAL THERAPY - DAILY TREATMENT NOTE    Patient Name: Bernardo Basurto        Date: 2020  : 1960   YES Patient  Verified  Visit #:   15   of   18  Insurance: Payor: Nabil Acharya / Plan: Radha Jane [id number] PPO / Product Type: PPO /      In time: 3:30 P Out time: 4:37 P   Total Treatment Time: 60     BCBS/Medicare Time Tracking (below)   Total Timed Codes (min):  50 1:1 Treatment Time:  50     TREATMENT AREA = Right shoulder pain [M25.511]    SUBJECTIVE  Pain Level (on 0 to 10 scale):  2  / 10   Medication Changes/New allergies or changes in medical history, any new surgeries or procedures? NO    If yes, update Summary List   Subjective Functional Status/Changes:  []  No changes reported     Patient reports the pain in his biceps is gone most of the pain is in the back of his upper arm.             Modalities Rationale:     decrease inflammation and decrease pain to improve patient's ability to return to pain-free lifting using R UE   min [] Estim, type/location:                                      []  att     []  unatt     []  w/US     []  w/ice    []  w/heat    min []  Mechanical Traction: type/lbs                   []  pro   []  sup   []  int   []  cont    []  before manual    []  after manual    min []  Ultrasound, settings/location:      min []  Iontophoresis w/ dexamethasone, location:                                               []  take home patch       []  in clinic   10 min [x]  Ice     []  Heat    location/position: Seated to R shoulder     min []  Vasopneumatic Device, press/temp:     min []  Other:    [] Skin assessment post-treatment (if applicable):    [x]  intact    [x]  redness- no adverse reaction     []redness  adverse reaction:        50 min Therapeutic Exercise:  [x]  See flow sheet   Rationale:      increase ROM and increase strength to improve the patients ability to return to pain-free reaching overhead using R UE    Billed With/As:   [] TE   [] TA   [] Neuro   [] Self Care Patient Education: [x] Review HEP    [] Progressed/Changed HEP based on :   [] positioning   [] body mechanics   [] transfers   [] heat/ice application    [] other:      Other Objective/Functional Measures:    Progressed to PREs & theraband per flow sheet     Post Treatment Pain Level (on 0 to 10) scale:   2/ 10     ASSESSMENT  Assessment/Changes in Function:     Good tolerance to strength progressions today      []  See Progress Note/Recertification   Patient will continue to benefit from skilled PT services to modify and progress therapeutic interventions, address functional mobility deficits, address ROM deficits, address strength deficits, analyze and cue movement patterns, analyze and modify body mechanics/ergonomics, assess and modify postural abnormalities and instruct in home and community integration to attain remaining goals.    Progress toward goals / Updated goals:    Progressing towards LTG 3     PLAN  []  Upgrade activities as tolerated YES Continue plan of care   []  Discharge due to :    []  Other:      Therapist: Bryce Ascencio PT    Date: 9/1/2020 Time: 4:44 PM     Future Appointments   Date Time Provider Rafy Arce   9/3/2020  3:15 PM Aubrey Yeager, PT [facility]   9/8/2020  3:30 PM Aubrey Yeager, PT [facility]   9/10/2020  2:45 PM Aubrey Yeager, PT [facility]   9/15/2020  3:30 PM Aubrey Yeager, PT [facility]   9/17/2020  3:15 PM Aubrey Yeager, PT [facility]   9/22/2020  3:30 PM Aubrey Yeager, PT [facility]   9/24/2020  3:15 PM Aubrey Yeager, PT [facility]   9/29/2020  3:30 PM Aubrey Yeager, PT [facility]   10/1/2020  3:15 PM Nolberto Sharma, PT [facility]

## 2020-09-03 ENCOUNTER — HOSPITAL ENCOUNTER (OUTPATIENT)
Dept: PHYSICAL THERAPY | Age: 60
Discharge: HOME | End: 2020-09-03
Payer: COMMERCIAL

## 2020-09-03 PROCEDURE — 97140 MANUAL THERAPY 1/> REGIONS: CPT

## 2020-09-03 PROCEDURE — 97110 THERAPEUTIC EXERCISES: CPT

## 2020-09-03 NOTE — PROGRESS NOTES
PHYSICAL THERAPY - DAILY TREATMENT NOTE    Patient Name: Johanna Daniels        Date: 9/3/2020  : 1960   YES Patient  Verified  Visit #:     Insurance: Payor: Jin Armando / Plan: Radha Jane [id number] PPO / Product Type: PPO /      In time: 3:15 P Out time: 4:20 P   Total Treatment Time: 60     BCBS/Medicare Time Tracking (below)   Total Timed Codes (min):  50 1:1 Treatment Time:  50     TREATMENT AREA =  Right shoulder pain [M25.511]    SUBJECTIVE  Pain Level (on 0 to 10 scale):  2  / 10   Medication Changes/New allergies or changes in medical history, any new surgeries or procedures? NO    If yes, update Summary List   Subjective Functional Status/Changes:  []  No changes reported     Patient reports he is having some pain in his upper arm after last PT session.          Modalities Rationale:     decrease edema, decrease inflammation and decrease pain to improve patient's ability to return to pain-free lifting using R UE   min [] Estim, type/location:                                      []  att     []  unatt     []  w/US     []  w/ice    []  w/heat    min []  Mechanical Traction: type/lbs Sing                  []  pro   []  sup   []  int   []  cont    []  before manual    []  after manual    min []  Ultrasound, settings/location:      min []  Iontophoresis w/ dexamethasone, location:                                               []  take home patch       []  in clinic   10 min [x]  Ice     []  Heat    location/position: Seated to R shoulder    min []  Vasopneumatic Device, press/temp:     min []  Other:    [] Skin assessment post-treatment (if applicable):    [x]  intact    [x]  redness- no adverse reaction     []redness  adverse reaction:        35 min Therapeutic Exercise:  [x]  See flow sheet   Rationale:      increase ROM and increase strength to improve the patients ability to return to light lifting using R UE     15 min Manual Therapy: Gentle manual stretch for flex/scaption, [email or web address] of [email or web address] in supine   Rationale:      decrease pain and increase ROM to improve patient's ability to return to pain-free ROM with dressing    Billed With/As:   [] TE   [] TA   [] Neuro   [] Self Care Patient Education: [x] Review HEP    [] Progressed/Changed HEP based on:   [] positioning   [] body mechanics   [] transfers   [] heat/ice application    [] other:      Other Objective/Functional Measures:    Progressed to orange theraband with rows/IR today     Post Treatment Pain Level (on 0 to 10) scale:   2  / 10     ASSESSMENT  Assessment/Changes in Function:     Good tolerance to strength progressions today, no increase in pain      []  See Progress Note/Recertification   Patient will continue to benefit from skilled PT services to modify and progress therapeutic interventions, address functional mobility deficits, address ROM deficits, address strength deficits, analyze and modify body mechanics/ergonomics, assess and modify postural abnormalities and instruct in home and community integration to attain remaining goals.    Progress toward goals / Updated goals:    Progressing towards LTG 1, 2      PLAN  []  Upgrade activities as tolerated YES Continue plan of care   []  Discharge due to :    []  Other:      Therapist: Roberta Martinez, PT    Date: 9/3/2020 Time: 4:27 PM     Future Appointments   Date Time Provider Rafy Arce   9/8/2020  3:30 PM Claudia Lam [facility] [place] [facility]   9/10/2020  2:45 PM  Fell, PT [facility]   9/15/2020  3:30 PM  Fell, PT [facility]   9/17/2020  3:15 PM  Fell, PT [facility]   9/22/2020  3:30 PM  Fell, PT [facility]   9/24/2020  3:15 PM  Fell, PT [facility]   9/29/2020  3:30 PM  Fell, PT [facility]   10/1/2020  3:15 PM Tj Sharma Friend, PT [facility]

## 2020-09-08 ENCOUNTER — HOSPITAL ENCOUNTER (OUTPATIENT)
Dept: PHYSICAL THERAPY | Age: 60
Discharge: HOME | End: 2020-09-08
Payer: COMMERCIAL

## 2020-09-08 PROCEDURE — 97110 THERAPEUTIC EXERCISES: CPT

## 2020-09-08 PROCEDURE — 97140 MANUAL THERAPY 1/> REGIONS: CPT

## 2020-09-08 NOTE — PROGRESS NOTES
Due for yearly PHYSICAL THERAPY - DAILY TREATMENT NOTE    Patient Name: Autumn Zhang        Date: 2020  : 1960   YES Patient  Verified  Visit #:     Insurance: Payor: Pk Dimas / Plan: Radha Jane [id number] PPO / Product Type: PPO /      In time: 3:30 P Out time: 4:40 P   Total Treatment Time: 65     BCBS/Medicare Time Tracking (below)   Total Timed Codes (min):  55 1:1 Treatment Time:  45     TREATMENT AREA =  Right shoulder pain [M25.511]    SUBJECTIVE  Pain Level (on 0 to 10 scale):  3  / 10   Medication Changes/New allergies or changes in medical history, any new surgeries or procedures? NO    If yes, update Summary List   Subjective Functional Status/Changes:  []  No changes reported     Patient reports pain level at worst 7-8/10, 0/10 at best, pain in upper arm is consistent with reaching overhead.          Modalities Rationale:     decrease edema, decrease inflammation and decrease pain to improve patient's ability to return to pain-free lifting using R UE   min [] Estim, type/location:                                      []  att     []  unatt     []  w/US     []  w/ice    []  w/heat    min []  Mechanical Traction: type/lbs                   []  pro   []  sup   []  int   []  cont    []  before manual    []  after manual    min []  Ultrasound, settings/location:      min []  Iontophoresis w/ dexamethasone, location:                                               []  take home patch       []  in clinic   10 min [x]  Ice     []  Heat    location/position:     min []  Vasopneumatic Device, press/temp:     min []  Other:    [] Skin assessment post-treatment (if applicable):    []  intact    []  redness- no adverse reaction     []redness  adverse reaction:        45/  35 min Therapeutic Exercise:  [x]  See flow sheet   Rationale:      increase ROM and increase strength to improve the patients ability to return to light lifting using R UE     10 min Manual Therapy: Manual stretch for flex/scaption, [email or web address] of ABD in supine   Rationale:      decrease pain and increase ROM to improve patient's ability to return to pain-free overhead reaching with R UE    Billed With/As:   [] TE   [] TA   [] Neuro   [] Self Care Patient Education: [x] Review HEP    [] Progressed/Changed HEP based on:   [] positioning   [] body mechanics   [] transfers   [] heat/ice application    [] other:      Other Objective/Functional Measures:    Held PREs per flow sheet given c/o pain     Post Treatment Pain Level (on 0 to 10) scale:   3  / 10     ASSESSMENT  Assessment/Changes in Function:     C/o pain in capsular pattern of R shoulder, most notably with ER>flexion, fair tolerance to manual & self stretches     []  See Progress Note/Recertification   Patient will continue to benefit from skilled PT services to modify and progress therapeutic interventions, address functional mobility deficits, address ROM deficits, address strength deficits, analyze and modify body mechanics/ergonomics, assess and modify postural abnormalities and instruct in home and community integration to attain remaining goals.    Progress toward goals / Updated goals:    Progressing towards LTG 1 & 2     PLAN  []  Upgrade activities as tolerated YES Continue plan of care   []  Discharge due to :    [x]  Other: Progress note n/v     Therapist: Devin Pettit PT    Date: 9/8/2020 Time: 5:43 PM     Future Appointments   Date Time Provider Rafy Arce   9/10/2020  2:45 PM Tarri Bon, PT [facility]   9/15/2020  3:30 PM Tarri Bon, PT [facility]   9/17/2020  3:15 PM Tarri Bon, PT [facility]   9/22/2020  3:30 PM Tarri Bon, PT [facility]   9/24/2020  3:15 PM Tarri Bon, PT [facility]   9/29/2020  3:30 PM Tarri Bon, PT [facility]   10/1/2020  3:15 PM Reshma Sharma, PT [facility]

## 2020-09-10 ENCOUNTER — HOSPITAL ENCOUNTER (OUTPATIENT)
Dept: PHYSICAL THERAPY | Age: 60
Discharge: HOME | End: 2020-09-10
Payer: COMMERCIAL

## 2020-09-10 PROCEDURE — 97140 MANUAL THERAPY 1/> REGIONS: CPT

## 2020-09-10 PROCEDURE — 97110 THERAPEUTIC EXERCISES: CPT

## 2020-09-11 NOTE — PROGRESS NOTES
PHYSICAL THERAPY - DAILY TREATMENT NOTE    Patient Name: Rohit Hicks        Date: 9/10/2020  : 1960   YES Patient  Verified  Visit #:     Insurance: Payor: Dasha Henderson / Plan: Radha Jane [id number] PPO / Product Type: PPO /      In time: 2:45 P Out time: 3:45 P   Total Treatment Time: 55     BCBS/Medicare Time Tracking (below)   Total Timed Codes (min):  45 1:1 Treatment Time:  45     TREATMENT AREA = Right shoulder pain [M25.511]    SUBJECTIVE  Pain Level (on 0 to 10 scale):  3  / 10   Medication Changes/New allergies or changes in medical history, any new surgeries or procedures?     NO    If yes, update Summary List   Subjective Functional Status/Changes:  []  No changes reported     See progress note to MD           Modalities Rationale:     decrease edema, decrease inflammation and decrease pain to improve patient's ability to return to pain-free lifting   min [] Estim, type/location:                                      []  att     []  unatt     []  w/US     []  w/ice    []  w/heat    min []  Mechanical Traction: type/lbs                   []  pro   []  sup   []  int   []  cont    []  before manual    []  after manual    min []  Ultrasound, settings/location:      min []  Iontophoresis w/ dexamethasone, location:                                               []  take home patch       []  in clinic   10 min [x]  Ice     []  Heat    location/position: Seated to R shoulder    min []  Vasopneumatic Device, press/temp:     min []  Other:    [] Skin assessment post-treatment (if applicable):    [x]  intact    []  redness- no adverse reaction     []redness  adverse reaction:        30 min Therapeutic Exercise:  [x]  See flow sheet   Rationale:      increase ROM, increase strength and improve coordination to improve the patients ability to return to light lifting at home     Billed With/As:   [] TE   [] TA   [] Neuro   [] Self Care Patient Education: [x] Review HEP    [] Progressed/Changed HEP based on:   [] positioning   [] body mechanics   [] transfers   [] heat/ice application    [] other:      Other Objective/Functional Measures:    FOTO 51 points  PROM/l flex/scaption 155 deg, [email or web address] of ABD     Post Treatment Pain Level (on 0 to 10) scale:   2  / 10     ASSESSMENT  Assessment/Changes in Function:     Resumed post capsule, improved tolerance to self stretches today      []  See Progress Note/Recertification   Patient will continue to benefit from skilled PT services to  to attain remaining goals.    Progress toward goals / Updated goals:    Progressing towards LTG 1 & LTG 4 met     PLAN  []  Upgrade activities as tolerated YES Continue plan of care   []  Discharge due to :    [x]  Other: Progress note to MD     Therapist: Maryann Trejo PT    Date: 9/10/2020 Time: 10:34 PM     Future Appointments   Date Time Provider Rafy Arce   9/14/2020  3:30 PM Danne Babinski, PT [facility]   9/17/2020  3:15 PM Danne Babinski, PT [facility]   9/22/2020  3:30 PM Danne Babinski, PT [facility]   9/24/2020  3:15 PM Danne Babinski, PT [facility]   9/29/2020  3:30 PM Danne Babinski, PT [facility]   10/1/2020  3:15 PM Vasile Sharma, PT [facility]

## 2020-09-14 ENCOUNTER — HOSPITAL ENCOUNTER (OUTPATIENT)
Dept: PHYSICAL THERAPY | Age: 60
Discharge: HOME | End: 2020-09-14
Payer: COMMERCIAL

## 2020-09-14 PROCEDURE — 97110 THERAPEUTIC EXERCISES: CPT

## 2020-09-14 PROCEDURE — 97140 MANUAL THERAPY 1/> REGIONS: CPT

## 2020-09-14 NOTE — PROGRESS NOTES
PHYSICAL THERAPY - DAILY TREATMENT NOTE    Patient Name: Shira Jaffe        Date: 2020  : 1960   YES Patient  Verified  Visit #:     Insurance: Payor: Mg Kelly / Plan: Radha Jane [id number] PPO / Product Type: PPO /      In time: 3:30 P Out time: 4:40 P   Total Treatment Time: 65     BCBS/Medicare Time Tracking (below)   Total Timed Codes (min):  55 1:1 Treatment Time:  45     TREATMENT AREA =  Right shoulder pain [M25.511]    SUBJECTIVE  Pain Level (on 0 to 10 scale):  2  / 10   Medication Changes/New allergies or changes in medical history, any new surgeries or procedures? NO    If yes, update Summary List   Subjective Functional Status/Changes:  []  No changes reported     Patient reports no new complaints over the weekend, he still cannot reach behind his back.             Modalities Rationale:     decrease edema, decrease inflammation and decrease pain to improve patient's ability to return to pain-free lifting using R UE   min [] Estim, type/location:                                      []  att     []  unatt     []  w/US     []  w/ice    []  w/heat    min []  Mechanical Traction: type/lbs                   []  pro   []  sup   []  int   []  cont    []  before manual    []  after manual    min []  Ultrasound, settings/location:      min []  Iontophoresis w/ dexamethasone, location:                                               []  take home patch       []  in clinic   10 min [x]  Ice     []  Heat    location/position: Seated to R shoulder      min []  Vasopneumatic Device, press/temp:     min []  Other:    [] Skin assessment post-treatment (if applicable):    [x]  intact    []  redness- no adverse reaction     []redness  adverse reaction:        40/  30 min Therapeutic Exercise:  [x]  See flow sheet   Rationale:      increase ROM and increase strength to improve the patients ability to return to pain-free dressing     15 min Manual Therapy: Gentle manual stretch for ER/IR @ 90 of ABD, flex/scaption, oscillations for pain control    Rationale:      decrease pain to improve patient's ability to return to pain-free reaching overhead with R UE    Billed With/As:   [] TE   [] TA   [] Neuro   [] Self Care Patient Education: [x] Review HEP    [] Progressed/Changed HEP based on:   [] positioning   [] body mechanics   [] transfers   [] heat/ice application    [] other:      Other Objective/Functional Measures: Added sleeper stretch on R, dowel ext & IR in standing     Post Treatment Pain Level (on 0 to 10) scale:   2  / 10     ASSESSMENT  Assessment/Changes in Function:     Good tolerance with progression of ROM, posterior capsule test continues to be painfully limited      []  See Progress Note/Recertification   Patient will continue to benefit from skilled PT services to modify and progress therapeutic interventions and address ROM deficits to attain remaining goals.    Progress toward goals / Updated goals:    Progressing towards newly established LTGs      PLAN  []  Upgrade activities as tolerated YES Continue plan of care   []  Discharge due to :    []  Other:      Therapist: Junior Ocampo PT    Date: 9/14/2020 Time: 5:27 PM     Future Appointments   Date Time Provider Rafy Arce   9/17/2020  3:15 PM Leroy Bhatia, PT [facility]   9/22/2020  3:30 PM Leroy Bhatia, PT [facility]   9/24/2020  3:15 PM Leroy Bhatia, PT [facility]   9/29/2020  3:30 PM Leroy Bhatia, PT [facility]   10/1/2020  3:00 PM Arik Sharma, PT [facility]

## 2020-09-15 ENCOUNTER — APPOINTMENT (OUTPATIENT)
Dept: PHYSICAL THERAPY | Age: 60
End: 2020-09-15
Payer: COMMERCIAL

## 2020-09-17 ENCOUNTER — HOSPITAL ENCOUNTER (OUTPATIENT)
Dept: PHYSICAL THERAPY | Age: 60
Discharge: HOME | End: 2020-09-17
Payer: COMMERCIAL

## 2020-09-17 PROCEDURE — 97110 THERAPEUTIC EXERCISES: CPT

## 2020-09-17 PROCEDURE — 97140 MANUAL THERAPY 1/> REGIONS: CPT

## 2020-09-17 NOTE — PROGRESS NOTES
PHYSICAL THERAPY - DAILY TREATMENT NOTE    Patient Name: Chen Malave        Date: 2020  : 1960   YES Patient  Verified  Visit #:     Insurance: Payor: Leah Delacruz / Plan: Radha Jane [id number] PPO / Product Type: PPO /      In time: 3:15 P Out time: 4:10 P   Total Treatment Time: 50     BCBS/Medicare Time Tracking (below)   Total Timed Codes (min):  40 1:1 Treatment Time:  40     TREATMENT AREA = Right shoulder pain [M25.511]    SUBJECTIVE  Pain Level (on 0 to 10 scale):  2  / 10   Medication Changes/New allergies or changes in medical history, any new surgeries or procedures? NO    If yes, update Summary List   Subjective Functional Status/Changes:  []  No changes reported     Patient reports his R shoulder pain is about the same.             Modalities Rationale:     decrease edema, decrease inflammation and decrease pain to improve patient's ability to return to pain-free lifting using R UE   min [] Estim, type/location:                                      []  att     []  unatt     []  w/US     []  w/ice    []  w/heat    min []  Mechanical Traction: type/lbs                   []  pro   []  sup   []  int   []  cont    []  before manual    []  after manual    min []  Ultrasound, settings/location:      min []  Iontophoresis w/ dexamethasone, location:                                               []  take home patch       []  in clinic   10 min [x]  Ice     []  Heat    location/position: Seated to R shoulder     min []  Vasopneumatic Device, press/temp:     min []  Other:    [] Skin assessment post-treatment (if applicable):    []  intact    []  redness- no adverse reaction     []redness  adverse reaction:        30 min Therapeutic Exercise:  [x]  See flow sheet   Rationale:      increase ROM and increase strength to improve the patients ability to return to pain-free lifting using R UE    10 min Manual Therapy: Gentle manual stretch for flex/scaption in supine, [email or web address] of ABD Rationale:      decrease pain, increase ROM and increase postural awareness to improve patient's ability to return to pain-free dressing      Billed With/As:   [] TE   [] TA   [] Neuro   [] Self Care Patient Education: [x] Review HEP    [] Progressed/Changed HEP based on:   [] positioning   [] body mechanics   [] transfers   [] heat/ice application    [] other:      Other Objective/Functional Measures:    Flex/scaption: 135 degrees in standing     Post Treatment Pain Level (on 0 to 10) scale:   2  / 10     ASSESSMENT  Assessment/Changes in Function:     Steady progress with ROM/strength      []  See Progress Note/Recertification   Patient will continue to benefit from skilled PT services to modify and progress therapeutic interventions, address functional mobility deficits, address ROM deficits, address strength deficits, analyze and modify body mechanics/ergonomics, assess and modify postural abnormalities and instruct in home and community integration to attain remaining goals.    Progress toward goals / Updated goals:    Progressing towards LTG 1      PLAN  []  Upgrade activities as tolerated YES Continue plan of care   []  Discharge due to :    []  Other:      Therapist: Roberta Martinez PT    Date: 9/17/2020 Time: 4:49 PM     Future Appointments   Date Time Provider Rafy Arce   9/22/2020  3:30 PM Claudia Lam [facility]   9/24/2020  3:15 PM  Fell, PT [facility]   9/29/2020  3:30 PM  Fell, PT [facility]   10/1/2020  3:00 PM Tj Sharma, PT [facility]

## 2020-09-22 ENCOUNTER — HOSPITAL ENCOUNTER (OUTPATIENT)
Dept: PHYSICAL THERAPY | Age: 60
Discharge: HOME | End: 2020-09-22
Payer: COMMERCIAL

## 2020-09-22 PROCEDURE — 97140 MANUAL THERAPY 1/> REGIONS: CPT

## 2020-09-22 PROCEDURE — 97110 THERAPEUTIC EXERCISES: CPT

## 2020-09-22 NOTE — PROGRESS NOTES
[facility] PHYSICAL THERAPY AT [address] [place] [address]  [address], [address] ,[address], [address]  Phone: [phone]  Fax: [phone]  PROGRESS NOTE  Patient Name: Stephani Cortez : 1960   Treatment/Medical Diagnosis: Right shoulder pain [M25.511]   Referral Source: [facility]     Date of Initial Visit: 20 Attended Visits: 19 Missed Visits: 1     SUMMARY OF TREATMENT  Therapeutic exercise including ROM, capsular stretching, gentle strengthening, postural ed, patient education, HEP instruction, CP, manual therapy including PROM/gentle manual stretching    CURRENT STATUS  Mr. Parth Wetson continues to report intermittent c/o pain in R shoulder in upper arm region (average pain 1/10, 7/10 at worst)  he reports consistent c/o pain since incident on 20 when he was lifting a chair, he reports slight decrease in pain after being issued mobic x 2 weeks at last f/u although pain continues to be limiting at end range elevation with reaching overhead, out to the side or across his body, such as cleaning under his opposite arm in the shower (this movement is the most painful). Self stretches, particularly posterior capsule stretches continues to be limited by pain. PROM as follows: Scaption: 145 degrees, [email or web address] of ABD: 65 degrees both in supine, [email or web address] of ABD to 70 degrees in supine. Please see below for other improvements with PT. Goal/Measure of Progress Goal Met? 1. Improve FOTO score from 30 points to > or = 58 points indicating improved tolerance with ADLs in regards to R shoulder. Status at last Eval: 30 Current Status: 51 points  progressing   2. Improve R shoulder AROM for flex/scaption to 120 degrees so ROM is available for overhead reaching. Status at last Eval: 108 degrees in standing Current Status: Flex/scaption: 135 degrees in standing, IR to level of distal glut yes   3.   Improve overall strength of R shoulder to 3+/5 so strength is available for return to light lifting activities at work/home. Status at last Eval: NA Current Status: Grossly 3/5 strength overall  yes     New Goals to be achieved in __3-4__  weeks:  1. Improve FOTO score from 51 points to > or = 58 points indicating improved tolerance with ADLs in regards to R shoulder. 2.  Improve R shoulder AROM for [email or web address] of ABD to 45 degrees in supine, IR to level of L4, scaption > or = 140 degrees in standing. 3.  Improve overall strength of R shoulder to 4/5 so strength is available for return to light lifting activities at work/home. 4.  Patient will report decreased c/o pain to < or = 1-2/10 to facilitate sleeping at night uninterrupted with manageable sx in R shoulder. RECOMMENDATIONS  Patient to continue with PT for up to 3-4 more weeks in order to progress towards achieving all LTGs  If you have any questions/comments please contact us directly at [phone]. Thank you for allowing us to assist in the care of your patient. Therapist Signature: JOE Weaver, cert MDT Date: 5/09/9180     Time: 3:10 PM   NOTE TO PHYSICIAN:  PLEASE COMPLETE THE ORDERS BELOW AND FAX TO   [facility] [place] [facility]: [phone]. If you are unable to process this request in 24 hours please contact our office: [phone]      ___ I have read the above report and request that my patient continue as recommended.   ___ I have read the above report and request that my patient continue therapy with the following changes/special instructions:_________________________________________________________   ___ I have read the above report and request that my patient be discharged from therapy.      Physician Signature:        Date:       Time:

## 2020-09-22 NOTE — PROGRESS NOTES
PHYSICAL THERAPY - DAILY TREATMENT NOTE    Patient Name: Leandra Padron        Date: 2020  : 1960   YES Patient  Verified  Visit #:     Insurance: Payor: Gabrielle Marley / Plan: Radha Jane [id number] PPO / Product Type: PPO /      In time: 3:30 P Out time: 4:35 P   Total Treatment Time: 60     BCBS/Medicare Time Tracking (below)   Total Timed Codes (min):  50 1:1 Treatment Time:  50     TREATMENT AREA = Right shoulder pain [M25.511]    SUBJECTIVE  Pain Level (on 0 to 10 scale):  1  / 10   Medication Changes/New allergies or changes in medical history, any new surgeries or procedures?     NO    If yes, update Summary List   Subjective Functional Status/Changes:  []  No changes reported     See updated progress note from 9/10/20           Modalities Rationale:     decrease edema, decrease inflammation and decrease pain to improve patient's ability to return to pain-free lifting using R UE   min [] Estim, type/location:                                      []  att     []  unatt     []  w/US     []  w/ice    []  w/heat    min []  Mechanical Traction: type/lbs                   []  pro   []  sup   []  int   []  cont    []  before manual    []  after manual    min []  Ultrasound, settings/location:      min []  Iontophoresis w/ dexamethasone, location:                                               []  take home patch       []  in clinic   10 min [x]  Ice     []  Heat    location/position: Seated to R shoulder     min []  Vasopneumatic Device, press/temp:     min []  Other:    [] Skin assessment post-treatment (if applicable):    [x]  intact    []  redness- no adverse reaction     []redness  adverse reaction:        40 min Therapeutic Exercise:  [x]  See flow sheet   Rationale:      increase ROM and increase strength to improve the patients ability to return to pain-free lifting using R UE     10 min Manual Therapy: Gentle manual stretch for flexion/scaption, [email or web address] of ABD in supine Rationale:      decrease pain and increase ROM to improve patient's ability to return to pain-free reaching behind head      Billed With/As:   [] TE   [] TA   [] Neuro   [] Self Care Patient Education: [x] Review HEP    [] Progressed/Changed HEP based on:   [] positioning   [] body mechanics   [] transfers   [] heat/ice application    [] other:      Other Objective/Functional Measures:    AROM/PROM see updated progress note     Post Treatment Pain Level (on 0 to 10) scale:   1-2  / 10     ASSESSMENT  Assessment/Changes in Function:   Slow but steady progress with PT, posterior capsule stretches continues to be painfully limiting     []  See Progress Note/Recertification   Patient will continue to benefit from skilled PT services to modify and progress therapeutic interventions, address functional mobility deficits, address ROM deficits, address strength deficits, analyze and modify body mechanics/ergonomics, assess and modify postural abnormalities and instruct in home and community integration to attain remaining goals.    Progress toward goals / Updated goals:    Progressing towards LTG 1 & 2     PLAN  []  Upgrade activities as tolerated YES Continue plan of care   []  Discharge due to :    []  Other:      Therapist: Mariella Claude, PT    Date: 9/22/2020 Time: 4:55 PM     Future Appointments   Date Time Provider Rafy Arce   9/24/2020  3:15 PM [facility]   9/29/2020  3:30 PM Mariella Claude, PT [facility]   10/1/2020  3:00 PM Batool Sharma, PT [facility]

## 2020-09-24 ENCOUNTER — HOSPITAL ENCOUNTER (OUTPATIENT)
Dept: PHYSICAL THERAPY | Age: 60
Discharge: HOME | End: 2020-09-24
Payer: COMMERCIAL

## 2020-09-24 PROCEDURE — 97140 MANUAL THERAPY 1/> REGIONS: CPT

## 2020-09-24 PROCEDURE — 97110 THERAPEUTIC EXERCISES: CPT

## 2020-09-29 ENCOUNTER — HOSPITAL ENCOUNTER (OUTPATIENT)
Dept: PHYSICAL THERAPY | Age: 60
Discharge: HOME | End: 2020-09-29
Payer: COMMERCIAL

## 2020-09-29 PROCEDURE — 97110 THERAPEUTIC EXERCISES: CPT

## 2020-09-29 NOTE — PROGRESS NOTES
PHYSICAL THERAPY - DAILY TREATMENT NOTE    Patient Name: Frederick Maki        Date: 2020  : 1960   YES Patient  Verified  Visit #:      36  Insurance: Payor: Dustin Parish / Plan: [facility] PPO / Product Type: PPO /      In time: 3:30 P Out time: 4:35 P   Total Treatment Time: 60     BCBS/Medicare Time Tracking (below)   Total Timed Codes (min):  50 1:1 Treatment Time:  45     TREATMENT AREA = Right shoulder pain [M25.511]    SUBJECTIVE  Pain Level (on 0 to 10 scale):  1-2   10   Medication Changes/New allergies or changes in medical history, any new surgeries or procedures? NO    If yes, update Summary List   Subjective Functional Status/Changes:  []  No changes reported     Patient reports he had f/u with MD & he got an injection & is feeling a little better. Sleeping is still difficult & he will get up when his arm hurts.          Modalities Rationale:     decrease edema, decrease inflammation and decrease pain to improve patient's ability to return to pain-free sleeping at night.   min [] Estim, type/location:                                      []  att     []  unatt     []  w/US     []  w/ice    []  w/heat    min []  Mechanical Traction: type/lbs                   []  pro   []  sup   []  int   []  cont    []  before manual    []  after manual    min []  Ultrasound, settings/location:      min []  Iontophoresis w/ dexamethasone, location:                                               []  take home patch       []  in clinic   10 min [x]  Ice     []  Heat    location/position: Seated to R shoulder     min []  Vasopneumatic Device, press/temp:     min []  Other:    [] Skin assessment post-treatment (if applicable):    [x]  intact    []  redness- no adverse reaction     []redness  adverse reaction:        50/  45 min Therapeutic Exercise:  [x]  See flow sheet   Rationale:      increase ROM and increase strength to improve the patients ability to return to pain-free lifting using R UE     Billed With/As:   [] TE   [] TA   [] Neuro   [] Self Care Patient Education: [x] Review HEP    [] Progressed/Changed HEP based on:   [] positioning   [] body mechanics   [] transfers   [] heat/ice application    [] other:      Other Objective/Functional Measures: Added body blade stabilization exercises     Post Treatment Pain Level (on 0 to 10) scale:   1  / 10     ASSESSMENT  Assessment/Changes in Function:     Good tolerance to strength progressions & improved tolerance to capsular stretches today      []  See Progress Note/Recertification   Patient will continue to benefit from skilled PT services to modify and progress therapeutic interventions, address functional mobility deficits, address ROM deficits, address strength deficits, analyze and modify body mechanics/ergonomics, assess and modify postural abnormalities and instruct in home and community integration to attain remaining goals.    Progress toward goals / Updated goals:    Progressing towards newly established LTGs      PLAN  []  Upgrade activities as tolerated YES Continue plan of care   []  Discharge due to :    []  Other:      Therapist: Irina Colon, PT    Date: 9/29/2020 Time: 4:04 PM     Future Appointments   Date Time Provider Rafy Arce   10/1/2020  3:00 PM Stephan Sharma, PT [facility]

## 2020-10-01 ENCOUNTER — HOSPITAL ENCOUNTER (OUTPATIENT)
Dept: PHYSICAL THERAPY | Age: 60
Discharge: HOME | End: 2020-10-01
Payer: COMMERCIAL

## 2020-10-01 PROCEDURE — 97110 THERAPEUTIC EXERCISES: CPT

## 2020-10-01 NOTE — PROGRESS NOTES
PHYSICAL THERAPY - DAILY TREATMENT NOTE    Patient Name: Marilyn Brannon        Date: 10/1/2020  : 1960   YES Patient  Verified  Visit #:   25   of   36  Insurance: Payor: BLUE CROSS / Plan: [facility] PPO / Product Type: PPO /      In time: 3 P Out time: 4:05 P   Total Treatment Time: 60     BCBS/Medicare Time Tracking (below)   Total Timed Codes (min):  50 1:1 Treatment Time:  45     TREATMENT AREA = Right shoulder pain [M25.511]    SUBJECTIVE  Pain Level (on 0 to 10 scale):  1  / 10   Medication Changes/New allergies or changes in medical history, any new surgeries or procedures? NO    If yes, update Summary List   Subjective Functional Status/Changes:  []  No changes reported     Patient reports no new complaints since last treatment, his arm feels a little better since the shot.             Modalities Rationale:     decrease edema, decrease inflammation and decrease pain to improve patient's ability to return to pain-free    min [] Estim, type/location:                                      []  att     []  unatt     []  w/US     []  w/ice    []  w/heat    min []  Mechanical Traction: type/lbs                   []  pro   []  sup   []  int   []  cont    []  before manual    []  after manual    min []  Ultrasound, settings/location:      min []  Iontophoresis w/ dexamethasone, location:                                               []  take home patch       []  in clinic   10 min [x]  Ice     []  Heat    location/position: Seated to R shoulder     min []  Vasopneumatic Device, press/temp:     min []  Other:    [] Skin assessment post-treatment (if applicable):    [x]  intact    []  redness- no adverse reaction     []redness  adverse reaction:        50/  45 min Therapeutic Exercise:  [x]  See flow sheet   Rationale:      increase ROM and increase strength to improve the patients ability to return to light lifting     Billed With/As:   [] TE   [] TA   [] Neuro   [] Self Care Patient Education: [x] Review HEP    [] Progressed/Changed HEP based on:   [] positioning   [] body mechanics   [] transfers   [] heat/ice application    [] other:      Other Objective/Functional Measures: Added wall flexion stretch & ER stretch at wall today     Post Treatment Pain Level (on 0 to 10) scale:   1  / 10     ASSESSMENT  Assessment/Changes in Function:     Good tolerance to progression of self stretches today      []  See Progress Note/Recertification   Patient will continue to benefit from skilled PT services to modify and progress therapeutic interventions, address functional mobility deficits, address ROM deficits, address strength deficits, analyze and modify body mechanics/ergonomics, assess and modify postural abnormalities and instruct in home and community integration to attain remaining goals.    Progress toward goals / Updated goals:    Progressing towards LTG 2      PLAN  []  Upgrade activities as tolerated YES Continue plan of care   []  Discharge due to :    []  Other:      Therapist: Felipe Zuñiga PT    Date: 10/1/2020 Time: 3:27 PM     Future Appointments   Date Time Provider Rafy Arce   10/6/2020  4:30 PM [facility]   10/9/2020  7:30 AM Joseph Arnoldise, PT [facility]   10/13/2020  3:45 PM Joseph Guise, PT [facility]   10/16/2020  7:30 AM Joseph Guise, PT [facility]   10/20/2020  3:45 PM Joseph Guise, PT [facility]   10/23/2020  8:15 AM Joseph Guise, PT [facility]   10/27/2020  3:45 PM Joseph Guise, PT [facility]   10/30/2020  7:30 AM Olegario Sharma, PT [facility]

## 2020-10-06 ENCOUNTER — HOSPITAL ENCOUNTER (OUTPATIENT)
Dept: PHYSICAL THERAPY | Age: 60
Discharge: HOME | End: 2020-10-06
Payer: COMMERCIAL

## 2020-10-06 PROCEDURE — 97110 THERAPEUTIC EXERCISES: CPT

## 2020-10-06 NOTE — PROGRESS NOTES
PHYSICAL THERAPY - DAILY TREATMENT NOTE    Patient Name: David Eller        Date: 10/6/2020  : 1960   YES Patient  Verified  Visit #:   32   of   36  Insurance: Payor: BLUE CROSS / Plan: [facility] PPO / Product Type: PPO /      In time: 4:30 P Out time: 5:30 P   Total Treatment Time: 60     BCBS/Medicare Time Tracking (below)   Total Timed Codes (min):  50 1:1 Treatment Time:  45     TREATMENT AREA = Right shoulder pain [M25.511]    SUBJECTIVE  Pain Level (on 0 to 10 scale):  1  / 10   Medication Changes/New allergies or changes in medical history, any new surgeries or procedures? NO    If yes, update Summary List   Subjective Functional Status/Changes:  []  No changes reported     Patient reports he feels a little better since the shot & feels like he has made the turn.            Modalities Rationale:     decrease edema, decrease inflammation and decrease pain to improve patient's ability to return to pain-free reaching overhead   min [] Estim, type/location:                                      []  att     []  unatt     []  w/US     []  w/ice    []  w/heat    min []  Mechanical Traction: type/lbs                   []  pro   []  sup   []  int   []  cont    []  before manual    []  after manual    min []  Ultrasound, settings/location:      min []  Iontophoresis w/ dexamethasone, location:                                               []  take home patch       []  in clinic   10 min [x]  Ice     []  Heat    location/position: Seated to R shoulder     min []  Vasopneumatic Device, press/temp:     min []  Other:    [] Skin assessment post-treatment (if applicable):    [x]  intact    []  redness- no adverse reaction     []redness  adverse reaction:        50/  45 min Therapeutic Exercise:  [x]  See flow sheet   Rationale:      increase ROM and increase strength to improve the patients ability to return to lifting using R UE    Billed With/As:   [] TE   [] TA   [] Neuro   [] Self Care Patient Education: [x] Review HEP    [] Progressed/Changed HEP based on:   [] positioning   [] body mechanics   [] transfers   [] heat/ice application    [] other:      Other Objective/Functional Measures:    Progressed PREs per flow sheet, added IR belt stretch behind belt     Post Treatment Pain Level (on 0 to 10) scale:   1  / 10     ASSESSMENT  Assessment/Changes in Function:     Improved tolerance to posterior capsule stretch     []  See Progress Note/Recertification   Patient will continue to benefit from skilled PT services to modify and progress therapeutic interventions, address functional mobility deficits, address ROM deficits, address strength deficits, analyze and modify body mechanics/ergonomics, assess and modify postural abnormalities and instruct in home and community integration to attain remaining goals.    Progress toward goals / Updated goals:    Progressing towards LTG 2      PLAN  []  Upgrade activities as tolerated YES Continue plan of care   []  Discharge due to :    []  Other:      Therapist: Marie Gdooy PT    Date: 10/6/2020 Time: 5:30 PM     Future Appointments   Date Time Provider Rafy Arce   10/9/2020  7:30 AM Mary Jacobsen, PT [facility]   10/13/2020  3:45 PM Mary Jacobsen, PT [facility]   10/16/2020  7:30 AM Mary Jacobsen, PT [facility]   10/20/2020  3:45 PM Mary Jacobsen, PT [facility]   10/23/2020  8:15 AM Mary Jacobsen, PT [facility]   10/27/2020  3:45 PM Mary Jacobsen, PT [facility]   10/30/2020  7:30 AM Kenyetta Sharma, PT [facility]

## 2020-10-09 ENCOUNTER — HOSPITAL ENCOUNTER (OUTPATIENT)
Dept: PHYSICAL THERAPY | Age: 60
Discharge: HOME | End: 2020-10-09
Payer: COMMERCIAL

## 2020-10-09 PROCEDURE — 97110 THERAPEUTIC EXERCISES: CPT

## 2020-10-09 NOTE — PROGRESS NOTES
PHYSICAL THERAPY - DAILY TREATMENT NOTE    Patient Name: Jennie Jones        Date: 10/9/2020  : 1960   YES Patient  Verified  Visit #:   32   of   36  Insurance: Payor: BLUE CROSS / Plan: [facility] PPO / Product Type: PPO /      In time: 7:30 A Out time: 8:35 A   Total Treatment Time: 60     BCBS/Medicare Time Tracking (below)   Total Timed Codes (min):  50 1:1 Treatment Time:  45     TREATMENT AREA = Right shoulder pain [M25.511]    SUBJECTIVE  Pain Level (on 0 to 10 scale):  1  / 10   Medication Changes/New allergies or changes in medical history, any new surgeries or procedures? NO    If yes, update Summary List   Subjective Functional Status/Changes:  []  No changes reported     Patient reports that tolerance to his stretches are better.             Modalities Rationale:     decrease edema, decrease inflammation and decrease pain to improve patient's ability to return to pain-free lifting using R UE   min [] Estim, type/location:                                      []  att     []  unatt     []  w/US     []  w/ice    []  w/heat    min []  Mechanical Traction: type/lbs                   []  pro   []  sup   []  int   []  cont    []  before manual    []  after manual    min []  Ultrasound, settings/location:      min []  Iontophoresis w/ dexamethasone, location:                                               []  take home patch       []  in clinic   10 min [x]  Ice     []  Heat    location/position: Seated to R shoulder    min []  Vasopneumatic Device, press/temp:     min []  Other:    [] Skin assessment post-treatment (if applicable):    [x]  intact    []  redness- no adverse reaction     []redness  adverse reaction:        50/  45 min Therapeutic Exercise:  [x]  See flow sheet   Rationale:      increase ROM and increase strength to improve the patients ability to return to pain-free dressing using R UE     Billed With/As:   [] TE   [] TA   [] Neuro   [] Self Care Patient Education: [x] Review HEP    [] Progressed/Changed HEP based on:   [] positioning   [] body mechanics   [] transfers   [] heat/ice application    [] other:      Other Objective/Functional Measures: Added ER walkaway with orange theraband     Post Treatment Pain Level (on 0 to 10) scale:   1  / 10     ASSESSMENT  Assessment/Changes in Function:     Good tolerance to today's treatment, no increase in pain, challenged by current program     []  See Progress Note/Recertification   Patient will continue to benefit from skilled PT services to modify and progress therapeutic interventions, address functional mobility deficits, address ROM deficits, address strength deficits, analyze and modify body mechanics/ergonomics, assess and modify postural abnormalities and instruct in home and community integration to attain remaining goals.    Progress toward goals / Updated goals:    Progressing towards LTG 2      PLAN  []  Upgrade activities as tolerated YES Continue plan of care   []  Discharge due to :    []  Other:      Therapist: Gerson Hanson PT    Date: 10/9/2020 Time: 8:59 AM     Future Appointments   Date Time Provider Rafy Arce   10/13/2020  3:45 PM [facility]   10/16/2020  7:30 AM Diana Cason, PT [facility]   10/20/2020  3:45 PM Diana Cason, PT [facility]   10/23/2020  8:15 AM Diana Cason PT [facility]   10/27/2020  3:45 PM Diana Cason PT [facility]   10/30/2020  7:30 AM Tres Sharma, PT [facility]

## 2020-10-13 ENCOUNTER — HOSPITAL ENCOUNTER (OUTPATIENT)
Dept: PHYSICAL THERAPY | Age: 60
Discharge: HOME | End: 2020-10-13
Payer: COMMERCIAL

## 2020-10-13 PROCEDURE — 97110 THERAPEUTIC EXERCISES: CPT

## 2020-10-13 NOTE — PROGRESS NOTES
PHYSICAL THERAPY - DAILY TREATMENT NOTE    Patient Name: Annabelle Dubon        Date: 10/13/2020  : 1960   YES Patient  Verified  Visit #:   28   of   36  Insurance: Payor: Magaly Slade / Plan: [facility] PPO / Product Type: PPO /      In time: 3:45 P Out time: 4:48 P   Total Treatment Time: 60     BCBS/Medicare Time Tracking (below)   Total Timed Codes (min):  50 1:1 Treatment Time:  45     TREATMENT AREA = Right shoulder pain [M25.511]    SUBJECTIVE  Pain Level (on 0 to 10 scale):  1  / 10   Medication Changes/New allergies or changes in medical history, any new surgeries or procedures? NO    If yes, update Summary List   Subjective Functional Status/Changes:  []  No changes reported     Patient reports the pain in his R shoulder is a constant /10 but it is much better overall.             Modalities Rationale:     decrease edema, decrease inflammation and decrease pain to improve patient's ability to return to pain-free lifting using R UE   min [] Estim, type/location:                                      []  att     []  unatt     []  w/US     []  w/ice    []  w/heat    min []  Mechanical Traction: type/lbs                   []  pro   []  sup   []  int   []  cont    []  before manual    []  after manual    min []  Ultrasound, settings/location:      min []  Iontophoresis w/ dexamethasone, location:                                               []  take home patch       []  in clinic   10 min [x]  Ice     []  Heat    location/position: Seated to R shoulder     min []  Vasopneumatic Device, press/temp:     min []  Other:    [] Skin assessment post-treatment (if applicable):    [x]  intact    []  redness- no adverse reaction     []redness  adverse reaction:        50/  45 min Therapeutic Exercise:  [x]  See flow sheet   Rationale:      increase ROM and increase strength to improve the patients ability to return to pain-free lifting using R UE     Billed With/As:   [] TE   [] TA   [] Neuro   [] Self Care Patient Education: [x] Review HEP    [] Progressed/Changed HEP based on:   [] positioning   [] body mechanics   [] transfers   [] heat/ice application    [] other:      Other Objective/Functional Measures: Added ball drop using green weighted ball in plane of the scaption      Post Treatment Pain Level (on 0 to 10) scale:   1  / 10     ASSESSMENT  Assessment/Changes in Function:     Good tolerance to progressive strengthening program      []  See Progress Note/Recertification   Patient will continue to benefit from skilled PT services to modify and progress therapeutic interventions, address functional mobility deficits, address ROM deficits, address strength deficits, analyze and modify body mechanics/ergonomics, assess and modify postural abnormalities and instruct in home and community integration to attain remaining goals.    Progress toward goals / Updated goals:    Progressing towards LTG 1 & 2     PLAN  []  Upgrade activities as tolerated YES Continue plan of care   []  Discharge due to :    []  Other:      Therapist: Myrtle Saab PT    Date: 10/13/2020 Time: 4:02 PM     Future Appointments   Date Time Provider Rafy Arce   10/16/2020  7:30 AM Mookie Zhong, PT [facility]   10/20/2020  3:45 PM Mookie Zhong, PT [facility]   10/23/2020  8:15 AM Mookie Zhong, PT [facility]   10/27/2020  3:45 PM Mookie Zhong, PT [facility]   10/30/2020  7:30 AM Rob Sharma, PT [facility]

## 2020-10-16 ENCOUNTER — HOSPITAL ENCOUNTER (OUTPATIENT)
Dept: PHYSICAL THERAPY | Age: 60
Discharge: HOME | End: 2020-10-16
Payer: COMMERCIAL

## 2020-10-16 PROCEDURE — 97110 THERAPEUTIC EXERCISES: CPT

## 2020-10-16 NOTE — PROGRESS NOTES
PHYSICAL THERAPY - DAILY TREATMENT NOTE    Patient Name: Melissa Matos        Date: 10/16/2020  : 1960   YES Patient  Verified  Visit #:   34   of   36  Insurance: Payor: BLUE CROSS / Plan: [facility] CATAR[employer] [id number] PPO / Product Type: PPO /      In time: 8:15 A Out time: 9:20 A   Total Treatment Time: 60     BCBS/Medicare Time Tracking (below)   Total Timed Codes (min):  50 1:1 Treatment Time:  45     TREATMENT AREA = Right shoulder pain [M25.511]    SUBJECTIVE  Pain Level (on 0 to 10 scale):  1  / 10   Medication Changes/New allergies or changes in medical history, any new surgeries or procedures? NO    If yes, update Summary List   Subjective Functional Status/Changes:  []  No changes reported     Patient reports average pain level 1/10, 5/10 at worst, typically with reaching out to the side or behind his back, 0/10 at best  90% improvement overall.             Modalities Rationale:     decrease edema, decrease inflammation and decrease pain to improve patient's ability to return to pain-free use of R shoulder    min [] Estim, type/location:                                      []  att     []  unatt     []  w/US     []  w/ice    []  w/heat    min []  Mechanical Traction: type/lbs                   []  pro   []  sup   []  int   []  cont    []  before manual    []  after manual    min []  Ultrasound, settings/location:      min []  Iontophoresis w/ dexamethasone, location:                                               []  take home patch       []  in clinic   10 min [x]  Ice     []  Heat    location/position: Seated to R shoulder     min []  Vasopneumatic Device, press/temp:     min []  Other:    [] Skin assessment post-treatment (if applicable):    [x]  intact    [x]  redness- no adverse reaction     []redness  adverse reaction:        55/  45 min Therapeutic Exercise:  [x]  See flow sheet   Rationale:      increase ROM and increase strength to improve the patients ability to return to light lifting using R UE     Billed With/As:   [] TE   [] TA   [] Neuro   [] Self Care Patient Education: [x] Review HEP    [] Progressed/Changed HEP based on:   [] positioning   [] body mechanics   [] transfers   [] heat/ice application    [] other:      Other Objective/Functional Measures:    MMT: ABD 4+/5 c/o pain upon MMT, flex & ER 5-/5, IR 5/5 mild c/o upon upon MMT  R shoulder AROM WFL, IR at level of glut     Post Treatment Pain Level (on 0 to 10) scale:   0  / 10     ASSESSMENT  Assessment/Changes in Function:     Good progress with ROM/strength     []  See Progress Note/Recertification   Patient will continue to benefit from skilled PT services to modify and progress therapeutic interventions, address functional mobility deficits, address ROM deficits, address strength deficits, analyze and address soft tissue restrictions, analyze and cue movement patterns, analyze and modify body mechanics/ergonomics, assess and modify postural abnormalities and instruct in home and community integration to attain remaining goals.    Progress toward goals / Updated goals:    Progressing towards LTG 1, LTG 2, 3, 4 met     PLAN  []  Upgrade activities as tolerated YES Continue plan of care   []  Discharge due to :    []  Other:      Therapist: Felipe Zuñiga PT    Date: 10/16/2020 Time: 8:41 AM     Future Appointments   Date Time Provider Rafy Arce   10/20/2020  3:45 PM [facility] [place] [facility]   10/23/2020  8:15 AM Joseph Hassan, PT [facility]   10/27/2020  3:45 PM Joseph Hassan PT [facility]   10/30/2020  7:30 AM Olegario Sharma, PT [facility]

## 2020-10-20 ENCOUNTER — HOSPITAL ENCOUNTER (OUTPATIENT)
Dept: PHYSICAL THERAPY | Age: 60
End: 2020-10-20
Payer: COMMERCIAL

## 2020-10-23 ENCOUNTER — APPOINTMENT (OUTPATIENT)
Dept: PHYSICAL THERAPY | Age: 60
End: 2020-10-23
Payer: COMMERCIAL

## 2020-10-27 ENCOUNTER — HOSPITAL ENCOUNTER (OUTPATIENT)
Dept: PHYSICAL THERAPY | Age: 60
Discharge: HOME | End: 2020-10-27
Payer: COMMERCIAL

## 2020-10-27 PROCEDURE — 97110 THERAPEUTIC EXERCISES: CPT

## 2020-10-27 NOTE — PROGRESS NOTES
PHYSICAL THERAPY - DAILY TREATMENT NOTE    Patient Name: Kristi Peters        Date: 10/27/2020  : 1960   YES Patient  Verified  Visit #:     Insurance: Payor: Priti Begin / Plan: [employer] [id number] PPO / Product Type: PPO /      In time: 3:45 P Out time: 4:45 P   Total Treatment Time: 55     BCBS/Medicare Time Tracking (below)   Total Timed Codes (min):  55 1:1 Treatment Time:  45     TREATMENT AREA = Right shoulder pain [M25.511]    SUBJECTIVE  Pain Level (on 0 to 10 scale):  1  / 10   Medication Changes/New allergies or changes in medical history, any new surgeries or procedures? NO    If yes, update Summary List   Subjective Functional Status/Changes:  []  No changes reported     Patient reports he has been doing well & his shoulder has been feeling good, still has pain but is infrequent. He was not feeling well last week & had a COVID-19 test that was (-).             Modalities Rationale:    Patient deferred     min [] Estim, type/location:                                      []  att     []  unatt     []  w/US     []  w/ice    []  w/heat    min []  Mechanical Traction: type/lbs                   []  pro   []  sup   []  int   []  cont    []  before manual    []  after manual    min []  Ultrasound, settings/location:      min []  Iontophoresis w/ dexamethasone, location:                                               []  take home patch       []  in clinic    min []  Ice     []  Heat    location/position:     min []  Vasopneumatic Device, press/temp:     min []  Other:    [] Skin assessment post-treatment (if applicable):    []  intact    []  redness- no adverse reaction     []redness  adverse reaction:        55/  45 min Therapeutic Exercise:  [x]  See flow sheet   Rationale:      increase ROM and increase strength to improve the patients ability to return to pain-free lifting using R UE     Billed With/As:   [] TE   [] TA   [] Neuro   [] Self Care Patient Education: [x] Review HEP    [] Progressed/Changed HEP based on:   [] positioning   [] body mechanics   [] transfers   [] heat/ice application    [] other:      Other Objective/Functional Measures:    See flow sheet, increased to 3# with S/L ER     Post Treatment Pain Level (on 0 to 10) scale:   1  / 10     ASSESSMENT  Assessment/Changes in Function:     Good tolerance to strength progressions today      []  See Progress Note/Recertification   Patient will continue to benefit from skilled PT services to modify and progress therapeutic interventions, address functional mobility deficits, address ROM deficits, address strength deficits, analyze and cue movement patterns, analyze and modify body mechanics/ergonomics, assess and modify postural abnormalities and instruct in home and community integration to attain remaining goals.    Progress toward goals / Updated goals:     Progressing towards LTG 3 & 4     PLAN  []  Upgrade activities as tolerated YES Continue plan of care   []  Discharge due to :    [x]  Other: Re-assess for DC n/v     Therapist: Luis M Ray, PT    Date: 10/27/2020 Time: 4:57 PM     Future Appointments   Date Time Provider Rafy Arce   10/30/2020  7:30 AM Babatunde Sharma, PT [facility]

## 2020-10-30 ENCOUNTER — HOSPITAL ENCOUNTER (OUTPATIENT)
Dept: PHYSICAL THERAPY | Age: 60
Discharge: HOME | End: 2020-10-30
Payer: COMMERCIAL

## 2020-10-30 PROCEDURE — 97110 THERAPEUTIC EXERCISES: CPT

## 2020-10-30 NOTE — PROGRESS NOTES
PHYSICAL THERAPY - DAILY TREATMENT NOTE    Patient Name: Chuyita Plunkett        Date: 10/30/2020  : 1960   YES Patient  Verified  Visit #:   32   of   31  Insurance: Payor: BLUE CROSS / Plan: [employer]ha CATARINA [employer] [id number] PPO / Product Type: PPO /      In time: 7:35 A Out time: 8:35 A   Total Treatment Time: 55     BCBS/Medicare Time Tracking (below)   Total Timed Codes (min):  55 1:1 Treatment Time:  40     TREATMENT AREA = Right shoulder pain [M25.511]    SUBJECTIVE  Pain Level (on 0 to 10 scale):  1  / 10   Medication Changes/New allergies or changes in medical history, any new surgeries or procedures?     NO    If yes, update Summary List   Subjective Functional Status/Changes:  []  No changes reported     See DC summary            Modalities Rationale:    Patient deferred     min [] Estim, type/location:                                      []  att     []  unatt     []  w/US     []  w/ice    []  w/heat    min []  Mechanical Traction: type/lbs                   []  pro   []  sup   []  int   []  cont    []  before manual    []  after manual    min []  Ultrasound, settings/location:      min []  Iontophoresis w/ dexamethasone, location:                                               []  take home patch       []  in clinic    min []  Ice     []  Heat    location/position:     min []  Vasopneumatic Device, press/temp:     min []  Other:    [] Skin assessment post-treatment (if applicable):    []  intact    []  redness- no adverse reaction     []redness  adverse reaction:        55  40 min Therapeutic Exercise:  [x]  See flow sheet   Rationale:      increase ROM and increase strength to improve the patients ability to return to light lifting      Billed With/As:   [] TE   [] TA   [] Neuro   [] Self Care Patient Education: [x] Review HEP    [] Progressed/Changed HEP based on:   [] positioning   [] body mechanics   [] transfers   [] heat/ice application    [] other:      Other Objective/Functional Measures:    FOTO 69 points     Post Treatment Pain Level (on 0 to 10) scale:   1  / 10     ASSESSMENT  Assessment/Changes in Function:     Patient is independent with current program & was in agreement with DC to home program      []  See Progress Note/Recertification   Patient will continue to benefit from skilled PT services to modify and progress therapeutic interventions, address functional mobility deficits, address ROM deficits, address strength deficits, analyze and address soft tissue restrictions, analyze and cue movement patterns, analyze and modify body mechanics/ergonomics, assess and modify postural abnormalities, address imbalance/dizziness and instruct in home and community integration to attain remaining goals. Progress toward goals / Updated goals: All LTGs met     PLAN  []  Upgrade activities as tolerated NO Continue plan of care   []  Discharge due to :    []  Other:      Therapist: Marie Godoy PT    Date: 10/30/2020 Time: 12:21 PM     No future appointments.

## 2020-10-30 NOTE — PROGRESS NOTES
[facility] PHYSICAL THERAPY AT [address] [place] [address]  Blair Cantu [facility] [address], [address] ,[address], [address]  Phone: [phone]  Fax: [phone] SUMMARY  Patient Name: Aleida Medina : 1960   Treatment/Medical Diagnosis: Right shoulder pain [M25.511]   Referral Source: Carrie Garcia*     Date of Initial Visit: 20 Attended Visits: 31 Missed Visits: 1     SUMMARY OF TREATMENT  Therapeutic exercise including ROM, stretching, gentle strengthening, scapular stabilization training, postural ed, patient education, HEP instruction, MHP, CP    CURRENT STATUS  Mr. Chirag Roblero has made good progress with current treatment & reports intermittent c/o pain in R shoulder, average pain level at 1/10. Slight capsular tightness remains with ER, although tolerance to capsular stretches continues to improve. Improved score on FOTO to 69 points as compared to 30 points at evaluation. He reports 90-95% improvement overall since surgery, is pleased with his progress & feels ready for DC to HEP to maintain current level of function. Goal/Measure of Progress Goal Met? 1. Improve FOTO score from 51 points to > or = 58 points indicating improved tolerance with ADLs in regards to R shoulder. Status at last Eval: 51 Current Status: 67 yes   2. Improve R shoulder AROM for [email or web address] of ABD to 45 degrees in supine, IR to level of L4, scaption > or = 140 degrees in standing. Status at last Eval: [email or web address] of ABD 65 deg  [email or web address] of ABD 70 deg  Scaption 135 degrees Current Status: R shoulder AROM WFL yes   3. Improve overall strength of R shoulder to 4/5 so strength is available for return to light lifting activities at work/home.     Status at last Eval: 3/5 overall  Current Status: 5-/5 overall   4+/5 ABD yes   4. Patient will report decreased c/o pain to < or = 1-2/10 to facilitate sleeping at night uninterrupted with manageable sx in R shoulder.    Status at last Eval: 1/10 average  7/10 at worst Current Status: 1/10 average yes     RECOMMENDATIONS  Discontinue therapy. Progressing towards or have reached established goals. If you have any questions/comments please contact us directly at [phone]. Thank you for allowing us to assist in the care of your patient.     Therapist Signature: JOE Castillo cert MDT Date: 47-85-45     Time: 12:42 PM

## 2021-06-22 ENCOUNTER — HOSPITAL ENCOUNTER (OUTPATIENT)
Dept: PHYSICAL THERAPY | Age: 61
Discharge: HOME | End: 2021-06-22
Payer: COMMERCIAL

## 2021-06-22 PROCEDURE — 97014 ELECTRIC STIMULATION THERAPY: CPT

## 2021-06-22 PROCEDURE — 97162 PT EVAL MOD COMPLEX 30 MIN: CPT

## 2021-06-22 PROCEDURE — 97530 THERAPEUTIC ACTIVITIES: CPT

## 2021-06-22 NOTE — PROGRESS NOTES
[facility] PHYSICAL THERAPY AT [address], [address], [address]  Phone: [phone]  Fax: [phone] / [address] [address] PHYSICAL THERAPY SERVICES  Patient Name: Claudene Barnes : 1960   Medical   Diagnosis: Lower back pain [M54.5] Treatment Diagnosis: LBP   Onset Date: Chronic      Referral Source: Debbie Chandler DO Start of Care [facility]): 2021   Prior Hospitalization: See medical history Provider #: [id number]   Prior Level of Function: Manageable symptoms with ADLs   Comorbidities: H/o 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   ==================================================================================  Assessment / key information:  Patient is a pleasant 63 y.o. male who presents to [facility] with c/o chronic LBP. Patient reports onset of B LE pain over the last 6 months with graduaal worsening of symptoms over the last \"few months\" which were of unknown etiology. He has two nerve ablations with most recent ablation a few weeks ago with no change in pain levels (previous nerve ablations have been successful with symptom reduction). Current c/o pain are constant in nature in lower back & intermittent in B LE & worsen with activities such as prolonged standing & ambulation. Sx improve with short periods of sitting or reclining. He reports difficulty sleeping at night in a bed & typically favors sleeping in a recliner for support. Average reported pain level at 3-4/10, 9/10 at worst & 3/10 at best.  Upon objective evaluation, patient demonstrates l/s AROM as follows ~75% FIS, 25% EIS, L SGIS 50% & R SGIS 50% limited by pain on R. MMT revealed was [place].   V/c to perform bed mobility such as supine to sit transfer as well as rolling safely. Slump test was (-) bilaterally, SLR was (+) on L for reproduction of LE pain & on R for reproduction of LBP. Weakness noted with TA with assessment of functional active movements. Patient can benefit PT interventions to improve posture, decrease pain & improve strength to facilitate return to unlimited ADLs, work activities & overall functional status.   ==================================================================================  Eval Complexity: History HIGH Complexity :3+ comorbidities / personal factors will impact the outcome/ POC ;  Examination  MEDIUM Complexity : 3 Standardized tests and measures addressing body structure, function, activity limitation and / or participation in recreation ; Presentation MEDIUM Complexity : Evolving with changing characteristics ; Decision Making MEDIUM Complexity : FOTO score of 26-74; Overall Complexity MEDIUM  Problem List: pain affecting function, decrease ROM, decrease strength, impaired gait/ balance, decrease ADL/ functional abilitiies, decrease activity tolerance, decrease flexibility/ joint mobility and decrease transfer abilities   Treatment Plan may include any combination of the following: Therapeutic exercise, Therapeutic activities, Neuromuscular re-education, Physical agent/modality, Gait/balance training, Manual therapy, Aquatic therapy, Patient education, Self Care training, Functional mobility training, Home safety training and Stair training  Patient / Family readiness to learn indicated by: asking questions, trying to perform skills and interest  Persons(s) to be included in education: patient (P)  Barriers to Learning/Limitations: No;  none  Measures taken:    Patient Goal (s): \"move my back more and strengthen my muscles more\"   Patient self reported health status: fair  Rehabilitation Potential: fair   Short Term Goals:  To be accomplished in  2  weeks:  1) Establish HEP to prevent further disability. 2) Patient will report decreased c/o pain to < or = 2-3/10 to facilitate bed mobility with manageable sx in lower back. 3) Patient will be able to demonstrate the appropriate sitting posture with or without use of l/s roll to facilitate sitting activities at home/work. 4) Patient will be able to perform supine to sit transfer with good mechanics & no assistance for safety/support.  Long Term Goals: To be accomplished in  4  weeks:  1) Improve FOTO score from 44 points to > or = 52 points indicating improved tolerance with ADLs in regards to lower back. 2) Patient will demonstrate (-) neural tension with B SLR to facilitate driving activities with manageable sx. 3) Patient will report decreased c/o pain to < or = 1-2/10 to facilitate bed mobility with manageable sx in lower back. 4) Patient to be independent & compliant with HEP in preparation for D/C. Frequency / Duration:   Patient to be seen  2-3  times per week for 4  weeks:  Patient / Caregiver education and instruction: self care, activity modification, brace/ splint application and exercises    Therapist Signature: JOE Lopez, cert MDT Date: 2/70/2584   Certification Period: 6-22-21 to 9-20-21 Time: 10:37 AM   =================================================================================  I certify that the above Physical Therapy Services are being furnished while the patient is under my care. I agree with the treatment plan and certify that this therapy is necessary.     Physician Signature:                                                             Date:                                     Time:                                                                       Kwesi Munoz DO

## 2021-06-22 NOTE — PROGRESS NOTES
PHYSICAL THERAPY - DAILY TREATMENT NOTE    Patient Name: Sidney Dandy        Date: 2021  : 1960   YES Patient  Verified  Visit #:     Insurance: Payor: Edgar Cons / Plan: [facility] PPO / Product Type: PPO /      In time: 10:30 A Out time: 11:15 A   Total Treatment Time: 40     BCBS/Medicare Time Tracking (below)   Total Timed Codes (min):  40 1:1 Treatment Time:  40     TREATMENT AREA =  Lower back pain [M54.5]  SUBJECTIVE  Pain Level (on 0 to 10 scale):  5  / 10   Medication Changes/New allergies or changes in medical history, any new surgeries or procedures?     NO    If yes, update Summary List   Subjective Functional Status/Changes:  []  No changes reported     See POC            Modalities Rationale:     decrease pain to improve patient's ability to return to pain-free ADLs   15 min [x] Estim, type/location: IFC to l/s in supine                                      []  att     [x]  unatt     []  w/US     []  w/ice    [x]  w/heat    min []  Mechanical Traction: type/lbs                   []  pro   []  sup   []  int   []  cont    []  before manual    []  after manual    min []  Ultrasound, settings/location:      min []  Iontophoresis w/ dexamethasone, location:                                               []  take home patch       []  in clinic    min []  Ice     []  Heat    location/position:     min []  Vasopneumatic Device, press/temp:    If using vaso (only need to measure limb vaso being performed on)      pre-treatment girth :       post-treatment girth :       measured at (landmark location) :      min []  Other:    [] Skin assessment post-treatment (if applicable):    []  intact    []  redness- no adverse reaction                  []redness  adverse reaction:        Billed With/As:   [] TE   [] TA   [] Neuro   [] Self Care Patient Education: [x] Review HEP    [] Progressed/Changed HEP based on:   [] positioning   [] body mechanics   [] transfers   [] heat/ice application    [] other:      Other Objective/Functional Measures:    See POC     Post Treatment Pain Level (on 0 to 10) scale:   4-5  / 10     ASSESSMENT  Assessment/Changes in Function:     See POC      []  See Progress Note/Recertification   Patient will continue to benefit from skilled PT services to modify and progress therapeutic interventions, address functional mobility deficits, address ROM deficits, address strength deficits, analyze and address soft tissue restrictions, analyze and cue movement patterns, analyze and modify body mechanics/ergonomics, assess and modify postural abnormalities, address imbalance/dizziness and instruct in home and community integration to attain remaining goals. Progress toward goals / Updated goals:    Progressing towards goals established at [address] [facility] [address]  []  Upgrade activities as tolerated YES Continue plan of care   []  Discharge due to :    [x]  Other: Patient to hold off scheduling PT until f/u with MD this week, possible referral to neurosurgeon     Therapist: Emerson Dias, PT    Date: 6/22/2021 Time: 1:50 PM     No future appointments.

## 2021-07-08 ENCOUNTER — HOSPITAL ENCOUNTER (OUTPATIENT)
Dept: PHYSICAL THERAPY | Age: 61
Discharge: HOME | End: 2021-07-08
Payer: COMMERCIAL

## 2021-07-08 PROCEDURE — 97110 THERAPEUTIC EXERCISES: CPT

## 2021-07-08 PROCEDURE — 97112 NEUROMUSCULAR REEDUCATION: CPT

## 2021-07-08 PROCEDURE — 97014 ELECTRIC STIMULATION THERAPY: CPT

## 2021-07-09 NOTE — PROGRESS NOTES
PHYSICAL THERAPY - DAILY TREATMENT NOTE    Patient Name: Gabriela Ryan        Date: 2021  : 1960   YES Patient  Verified  Visit #:      of   12  Insurance: Payor: Mel Ken / Plan: Radha Jane [id number] PPO / Product Type: PPO /      In time: 5:15 P Out time: 6:10 P   Total Treatment Time: 50     BCBS/Medicare Time Tracking (below)   Total Timed Codes (min):  50 1:1 Treatment Time:  50     TREATMENT AREA = Lower back pain [M54.5]    SUBJECTIVE  Pain Level (on 0 to 10 scale):  6  / 10   Medication Changes/New allergies or changes in medical history, any new surgeries or procedures? NO    If yes, update Summary List   Subjective Functional Status/Changes:  []  No changes reported     Patient reports he has f/u with MD, he will have MRI of l/s on 21 & ROWENA on 21.           Modalities Rationale:     decrease pain to improve patient's ability to return to pain-free ADLs  15 min [x] Estim, type/location: IFC to l/s in supine                                      []  att     [x]  unatt     []  w/US     []  w/ice    [x]  w/heat    min []  Mechanical Traction: type/lbs                   []  pro   []  sup   []  int   []  cont    []  before manual    []  after manual    min []  Ultrasound, settings/location:      min []  Iontophoresis w/ dexamethasone, location:                                               []  take home patch       []  in clinic    min []  Ice     []  Heat    location/position:     min []  Vasopneumatic Device, press/temp:    If using vaso (only need to measure limb vaso being performed on)      pre-treatment girth :       post-treatment girth :       measured at (landmark location) :      min []  Other:    [] Skin assessment post-treatment (if applicable):    [x]  intact    [x]  redness- no adverse reaction                  []redness  adverse reaction:        20 min Therapeutic Exercise:  [x]  See flow sheet   Rationale:      increase ROM and increase strength to improve the patients ability to return to pain-free sitting       15 min Neuromuscular Re-ed: [x]  See flow sheet   Rationale:    increase strength, improve coordination, improve balance and increase proprioception to improve the patients ability to return to pain-free transfers    Billed With/As:   [] TE   [] TA   [] Neuro   [] Self Care Patient Education: [x] Review HEP    [] Progressed/Changed HEP based on:   [] positioning   [] body mechanics   [] transfers   [] heat/ice application    [] other:      Other Objective/Functional Measures:    Initiated exercises per flow sheet     Post Treatment Pain Level (on 0 to 10) scale:   6  / 10     ASSESSMENT  Assessment/Changes in Function:     Fair tolerance to today's treatment, weakness noted with TA/neutral spine exercises, no increase in pain      []  See Progress Note/Recertification   Patient will continue to benefit from skilled PT services to modify and progress therapeutic interventions, address functional mobility deficits, address ROM deficits, address strength deficits, analyze and address soft tissue restrictions, analyze and cue movement patterns, analyze and modify body mechanics/ergonomics, assess and modify postural abnormalities, address imbalance/dizziness and instruct in home and community integration to attain remaining goals.    Progress toward goals / Updated goals:    Progressing towards goals established at [address] [facility] [address]  []  Upgrade activities as tolerated YES Continue plan of care   []  Discharge due to :    []  Other:      Therapist: Rakan Mora PT    Date: 7/08/2021 Time: 6:55 PM     Future Appointments   Date Time Provider Rafy Arce   7/13/2021  5:45 PM Rui Phillips PT [facility]   7/15/2021  1:15 PM Rui Phillips PT [facility]   7/20/2021  2:00 PM Rita Perdue [facility] [place] [facility]   7/22/2021 11:15 AM Rui Phillips PT [facility]   7/27/2021 12:30 PM Rui Phillips PT [facility]   7/29/2021 11:00 AM [facility]   8/3/2021  2:45 PM Vito-Cruz, Estel Riedel, PT [facility]

## 2021-07-13 ENCOUNTER — HOSPITAL ENCOUNTER (OUTPATIENT)
Dept: PHYSICAL THERAPY | Age: 61
Discharge: HOME | End: 2021-07-13
Payer: COMMERCIAL

## 2021-07-13 PROCEDURE — 97112 NEUROMUSCULAR REEDUCATION: CPT

## 2021-07-13 PROCEDURE — 97014 ELECTRIC STIMULATION THERAPY: CPT

## 2021-07-13 PROCEDURE — 97110 THERAPEUTIC EXERCISES: CPT

## 2021-07-13 NOTE — PROGRESS NOTES
PHYSICAL THERAPY - DAILY TREATMENT NOTE    Patient Name: Nadia Yuen        Date: 2021  : 1960   YES Patient  Verified  Visit #:   3   of   12  Insurance: Payor: Aleena Tsai / Plan: [facility] PPO / Product Type: PPO /      In time: 4:30 P Out time: 5:35 P   Total Treatment Time: 60     BCBS/Medicare Time Tracking (below)   Total Timed Codes (min):  50 1:1 Treatment Time:  50     TREATMENT AREA = Lower back pain [M54.5]    SUBJECTIVE  Pain Level (on 0 to 10 scale):  4  / 10   Medication Changes/New allergies or changes in medical history, any new surgeries or procedures? NO    If yes, update Summary List   Subjective Functional Status/Changes:  []  No changes reported     Patient reports no new complaints, he is sore from being in a meeting & sitting for an hour.          Modalities Rationale:     decrease pain to improve patient's ability to return to pain-free ADLs  15 min [x] Estim, type/location: IFC to l/s in supine                                      []  att     [x]  unatt     []  w/US     []  w/ice    [x]  w/heat    min []  Mechanical Traction: type/lbs                   []  pro   []  sup   []  int   []  cont    []  before manual    []  after manual    min []  Ultrasound, settings/location:      min []  Iontophoresis w/ dexamethasone, location:                                               []  take home patch       []  in clinic    min []  Ice     []  Heat    location/position:     min []  Vasopneumatic Device, press/temp:    If using vaso (only need to measure limb vaso being performed on)      pre-treatment girth :       post-treatment girth :       measured at (landmark location) :      min []  Other:    [] Skin assessment post-treatment (if applicable):    [x]  intact    [x]  redness- no adverse reaction                  []redness  adverse reaction:        25 min Therapeutic Exercise:  [x]  See flow sheet   Rationale:      increase ROM and increase strength to improve the patients ability to return to pain-free sitting       20 min Neuromuscular Re-ed: [x]  See flow sheet   Rationale:    increase strength, improve coordination, improve balance and increase proprioception to improve the patients ability to tolerate unsupported standing    Billed With/As:   [] TE   [] TA   [] Neuro   [] Self Care Patient Education: [x] Review HEP    [] Progressed/Changed HEP based on:   [] positioning   [] body mechanics   [] transfers   [] heat/ice application    [] other:      Other Objective/Functional Measures: Added SB 1, 2 in H/L & S/L clam today      Post Treatment Pain Level (on 0 to 10) scale:  3  / 10     ASSESSMENT  Assessment/Changes in Function:     Good reduction in pain today after today's treatment      []  See Progress Note/Recertification   Patient will continue to benefit from skilled PT services to modify and progress therapeutic interventions, address functional mobility deficits, address ROM deficits, address strength deficits, analyze and address soft tissue restrictions, analyze and cue movement patterns, analyze and modify body mechanics/ergonomics, assess and modify postural abnormalities, address imbalance/dizziness and instruct in home and community integration to attain remaining goals.    Progress toward goals / Updated goals:    Progressing towards STG 2, STG 1 met      PLAN  []  Upgrade activities as tolerated YES Continue plan of care   []  Discharge due to :    []  Other:      Therapist: Curtis Elaine PT    Date: 7/08/2021 Time: 5:50 PM     Future Appointments   Date Time Provider Rafy Arce   7/13/2021  5:45 PM Zayda Escobedo PT [facility]   7/15/2021  2:45 PM Zayda Escobedo PT [facility]   7/20/2021  2:00 PM Bonny Martinez [facility] [place] [facility]   7/22/2021 11:15 AM Zayda Escobedo PT [facility]   7/27/2021 12:30 PM Zayda Escobedo PT [facility]   7/29/2021 11:00 AM Zayda Escobedo PT [facility]   8/3/2021  2:45 PM Rylie Abdalla, PT [facility]

## 2021-07-15 ENCOUNTER — HOSPITAL ENCOUNTER (OUTPATIENT)
Dept: PHYSICAL THERAPY | Age: 61
Discharge: HOME | End: 2021-07-15
Payer: COMMERCIAL

## 2021-07-15 PROCEDURE — 97112 NEUROMUSCULAR REEDUCATION: CPT

## 2021-07-15 PROCEDURE — 97014 ELECTRIC STIMULATION THERAPY: CPT

## 2021-07-15 PROCEDURE — 97110 THERAPEUTIC EXERCISES: CPT

## 2021-07-15 NOTE — PROGRESS NOTES
PHYSICAL THERAPY - DAILY TREATMENT NOTE    Patient Name: Yeni Jin        Date: 7/15/2021  : 1960   YES Patient  Verified  Visit #:     Insurance: Payor: Stefano López / Plan: Radha Jane [id number] PPO / Product Type: PPO /      In time: 2:45 P Out time: 4:00 P   Total Treatment Time: 60     BCBS/Medicare Time Tracking (below)   Total Timed Codes (min):  60 1:1 Treatment Time:  60     TREATMENT AREA =  Lower back pain [M54.5]    SUBJECTIVE  Pain Level (on 0 to 10 scale):  4  / 10   Medication Changes/New allergies or changes in medical history, any new surgeries or procedures? NO    If yes, update Summary List   Subjective Functional Status/Changes:  []  No changes reported     Patient reports he did a lot of sitting today, so he is sore today.           Modalities Rationale:     decrease edema, decrease inflammation and decrease pain to improve patient's ability to return to pain-free ADLs   15 min [x] Estim, type/location: IFC to l/s in supine                                      []  att     [x]  unatt     []  w/US     []  w/ice    [x]  w/heat    min []  Mechanical Traction: type/lbs                   []  pro   []  sup   []  int   []  cont    []  before manual    []  after manual    min []  Ultrasound, settings/location:      min []  Iontophoresis w/ dexamethasone, location:                                               []  take home patch       []  in clinic    min []  Ice     []  Heat    location/position:     min []  Vasopneumatic Device, press/temp:    If using vaso (only need to measure limb vaso being performed on)      pre-treatment girth :       post-treatment girth :       measured at (landmark location) :      min []  Other:    [] Skin assessment post-treatment (if applicable):    [x]  intact    [x]  redness- no adverse reaction                  []redness  adverse reaction:        30 min Therapeutic Exercise:  [x]  See flow sheet   Rationale:      increase ROM and increase strength to improve the patients ability to return to pain-free standing     15 min Neuromuscular Re-ed: [x]  See flow sheet   Rationale:    improve coordination, improve balance and increase proprioception to improve the patients ability to return to pain-free bed mobility     min Gait Training:  ___ feet with ___ device on level surfaces with ___ level of assistance   Rationale: To improve ambulation safety and efficiency in order to improve patient's ability to safely ambulate at home for self care. Billed With/As:   [] TE   [] TA   [] Neuro   [] Self Care Patient Education: [x] Review HEP    [] Progressed/Changed HEP based on:   [] positioning   [] body mechanics   [] transfers   [] heat/ice application    [] other:      Other Objective/Functional Measures: Added seated swiss ball flexion today     Post Treatment Pain Level (on 0 to 10) scale:   2  / 10     ASSESSMENT  Assessment/Changes in Function:     Improved tolerance to seated flexion vs in quadraped, good pain reduction with today's treatment      []  See Progress Note/Recertification   Patient will continue to benefit from skilled PT services to modify and progress therapeutic interventions, address functional mobility deficits, address ROM deficits, address strength deficits, analyze and address soft tissue restrictions, analyze and cue movement patterns, analyze and modify body mechanics/ergonomics, assess and modify postural abnormalities, address imbalance/dizziness and instruct in home and community integration to attain remaining goals.    Progress toward goals / Updated goals:    Progressing towards STG 2, 4      PLAN  []  Upgrade activities as tolerated YES Continue plan of care   []  Discharge due to :    []  Other:      Therapist: Joana Valencia PT    Date: 7/15/2021 Time: 3:01 PM     Future Appointments   Date Time Provider Rafy Arce   7/20/2021  2:00 PM Estrella Burgess, PACO [facility]   7/22/2021 11:15 AM Leobardo Guy [facility] [place] [facility]   7/27/2021 12:30 PM Ed Kamini, PT [facility]   7/29/2021 11:00 AM Ed Kamini, PT [facility]   8/3/2021  2:45 PM Ja Sharma, PT [facility]

## 2021-07-20 ENCOUNTER — HOSPITAL ENCOUNTER (OUTPATIENT)
Dept: PHYSICAL THERAPY | Age: 61
Discharge: HOME | End: 2021-07-20
Payer: COMMERCIAL

## 2021-07-20 PROCEDURE — 97112 NEUROMUSCULAR REEDUCATION: CPT

## 2021-07-20 PROCEDURE — 97110 THERAPEUTIC EXERCISES: CPT

## 2021-07-20 PROCEDURE — 97014 ELECTRIC STIMULATION THERAPY: CPT

## 2021-07-20 NOTE — PROGRESS NOTES
PHYSICAL THERAPY - DAILY TREATMENT NOTE    Patient Name: Nik Singh        Date: 2021  : 1960   YES Patient  Verified  Visit #:     Insurance: Payor: Daphne Trevizo / Plan: Radha Jane [id number] PPO / Product Type: PPO /      In time: 2 P Out time: 3:10 P   Total Treatment Time: 60     BCBS/Medicare Time Tracking (below)   Total Timed Codes (min):  60 1:1 Treatment Time:  60     TREATMENT AREA =  Lower back pain [M54.5]    SUBJECTIVE  Pain Level (on 0 to 10 scale):  4  / 10   Medication Changes/New allergies or changes in medical history, any new surgeries or procedures? NO    If yes, update Summary List   Subjective Functional Status/Changes:  []  No changes reported     Patient reports that he had some pain in his abdomen after last visit.            Modalities Rationale:     decrease pain to improve patient's ability to return to pain-free ADls   15 min [x] Estim, type/location: IFC to l/s in supine                                     []  att     [x]  unatt     []  w/US     []  w/ice    [x]  w/heat    min []  Mechanical Traction: type/lbs                   []  pro   []  sup   []  int   []  cont    []  before manual    []  after manual    min []  Ultrasound, settings/location:      min []  Iontophoresis w/ dexamethasone, location:                                               []  take home patch       []  in clinic    min []  Ice     []  Heat    location/position:     min []  Vasopneumatic Device, press/temp:    If using vaso (only need to measure limb vaso being performed on)      pre-treatment girth :       post-treatment girth :       measured at (landmark location) :      min []  Other:    [] Skin assessment post-treatment (if applicable):    [x]  intact    [x]  redness- no adverse reaction                  []redness  adverse reaction:        25 min Therapeutic Exercise:  [x]  See flow sheet   Rationale:      increase ROM and increase strength to improve the patients ability to return to pain-free lifting at home     20 min Neuromuscular Re-ed: [x]  See flow sheet   Rationale:    increase ROM and increase strength to improve the patients ability to return to pain-free unsupported sitting       Billed With/As:   [] TE   [] TA   [] Neuro   [] Self Care Patient Education: [x] Review HEP    [] Progressed/Changed HEP based on:   [] positioning   [] body mechanics   [] transfers   [] heat/ice application    [] other:      Other Objective/Functional Measures:    Resumed lion stretch in quadraped      Post Treatment Pain Level (on 0 to 10) scale:   3  / 10     ASSESSMENT  Assessment/Changes in Function:     Initial c/o pain with supine bridge, improved tolerance to this exercise as compared to last PT     []  See Progress Note/Recertification   Patient will continue to benefit from skilled PT services to modify and progress therapeutic interventions, address functional mobility deficits, address ROM deficits, address strength deficits, analyze and address soft tissue restrictions, analyze and cue movement patterns, analyze and modify body mechanics/ergonomics, assess and modify postural abnormalities, address imbalance/dizziness and instruct in home and community integration to attain remaining goals.    Progress toward goals / Updated goals:    Progressing towards LTG 1      PLAN  []  Upgrade activities as tolerated YES Continue plan of care   []  Discharge due to :    [x]  Other: Re-assess n/v      Therapist: Nannette Galicia PT    Date: 7/20/2021 Time: 2:28 PM     Future Appointments   Date Time Provider Rafy Arce   7/22/2021 11:15 AM [place] Pitts, PT [facility]   7/27/2021 12:30 PM Varsha Pitts PT [facility]   7/29/2021 11:00 AM Varsha Pitts PT [facility]   8/3/2021  2:45 PM Cash Sharma, PT [facility]

## 2021-07-22 ENCOUNTER — HOSPITAL ENCOUNTER (OUTPATIENT)
Dept: PHYSICAL THERAPY | Age: 61
Discharge: HOME | End: 2021-07-22
Payer: COMMERCIAL

## 2021-07-22 PROCEDURE — 97112 NEUROMUSCULAR REEDUCATION: CPT

## 2021-07-22 PROCEDURE — 97110 THERAPEUTIC EXERCISES: CPT

## 2021-07-22 PROCEDURE — 97014 ELECTRIC STIMULATION THERAPY: CPT

## 2021-07-22 NOTE — PROGRESS NOTES
PHYSICAL THERAPY - DAILY TREATMENT NOTE    Patient Name: Domi Solares        Date: 2021  : 1960   YES Patient  Verified  Visit #:     Insurance: Payor: Arabella Catherine / Plan: [facility] PPO / Product Type: PPO /      In time: 11:15 A Out time: 12:15 P   Total Treatment Time: 55     BCBS/Medicare Time Tracking (below)   Total Timed Codes (min):  55 1:1 Treatment Time:  55     TREATMENT AREA =  Lower back pain [M54.5]    SUBJECTIVE  Pain Level (on 0 to 10 scale):  5  / 10   Medication Changes/New allergies or changes in medical history, any new surgeries or procedures? NO    If yes, update Summary List   Subjective Functional Status/Changes:  []  No changes reported     Patient reports he is having more pain in his lower back today.             Modalities Rationale:     decrease pain to improve patient's ability to return to pain-free ADLs  15 min [x] Estim, type/location:  IFC to l/s in supine                                     []  att     [x]  unatt     []  w/US     []  w/ice    [x]  w/heat    min []  Mechanical Traction: type/lbs                   []  pro   []  sup   []  int   []  cont    []  before manual    []  after manual    min []  Ultrasound, settings/location:      min []  Iontophoresis w/ dexamethasone, location:                                               []  take home patch       []  in clinic    min []  Ice     []  Heat    location/position:     min []  Vasopneumatic Device, press/temp:    If using vaso (only need to measure limb vaso being performed on)      pre-treatment girth :       post-treatment girth :       measured at (landmark location) :      min []  Other:    [] Skin assessment post-treatment (if applicable):    [x]  intact    [x]  redness- no adverse reaction                  []redness  adverse reaction:        20 min Therapeutic Exercise:  [x]  See flow sheet   Rationale:      increase ROM and increase strength to improve the patients ability to return to pain-free standing      20 min Neuromuscular Re-ed: [x]  See flow sheet   Rationale:    increase strength, improve coordination, improve balance and increase proprioception to improve the patients ability to unsupported sitting      Billed With/As:   [] TE   [] TA   [] Neuro   [] Self Care Patient Education: [x] Review HEP    [] Progressed/Changed HEP based on:   [] positioning   [] body mechanics   [] transfers   [] heat/ice application    [] other:      Other Objective/Functional Measures: Added swiss ball seated abd draw     Post Treatment Pain Level (on 0 to 10) scale:   2  / 10     ASSESSMENT  Assessment/Changes in Function:     Good tolerance to today's treatment, c/o pain with sit to stand transfer from swiss ball & with initial curve reversal      []  See Progress Note/Recertification   Patient will continue to benefit from skilled PT services to modify and progress therapeutic interventions, address functional mobility deficits, address ROM deficits, address strength deficits, analyze and address soft tissue restrictions, analyze and cue movement patterns, analyze and modify body mechanics/ergonomics, assess and modify postural abnormalities, address imbalance/dizziness and instruct in home and community integration to attain remaining goals.    Progress toward goals / Updated goals:    Progressing towards STG 2, STG 3 met      PLAN  []  Upgrade activities as tolerated YES Continue plan of care   []  Discharge due to :    []  Other:      Therapist: Melbourne Jeans, PT    Date: 7/22/2021 Time: 12:17 PM     Future Appointments   Date Time Provider Rafy Arce   7/27/2021 12:30 PM Clemente [facility] [place] [address]   7/29/2021 11:00 AM Ankush Ayers, PT [facility] [address]   8/3/2021  2:45 PM Mic Sharma, PT [facility] [address]

## 2021-07-22 NOTE — PROGRESS NOTES
[facility] PHYSICAL THERAPY AT [address] [place] [address]  [facility] [place] [facility], [address] ,[address], [address]  Phone: [phone]  Fax: [phone]  PROGRESS NOTE  Patient Name: Alma Noriega : 1960   Treatment/Medical Diagnosis: Lower back pain [M54.5]   Referral Source: Zen Pierre DO     Date of Initial Visit: 21 Attended Visits: 6 Missed Visits: 0     SUMMARY OF TREATMENT  Therapeutic exercise including ROM, stretching, gentle strengthening, stabilization training, postural ed, patient education, HEP instruction, IFC with MHP. CURRENT STATUS  Mr. Shannon Benton continues to make slow but steady progress with PT, c/o LBP & B LE continues to be fairly constant, LBP>LE pain. Good tolerance to initial program, good reduction of pain after PT & ESTIM with MHP, although relief appears to be temporary in nature. Please see below for other improvements with PT. Goal/Measure of Progress Goal Met? 1.  Establish HEP to prevent further disability. Status at last Eval: NA Current Status: Good compliance with HEP  yes   2. Patient will report decreased c/o pain to < or = 2-3/10 to facilitate bed mobility with manageable sx in lower back. Status at last Eval: Average 3-4/10 Current Status: Average 4-5/10 progressing   3. Patient will be able to demonstrate the appropriate sitting posture with or without use of l/s roll to facilitate sitting activities at home/work. Status at last Eval: NA Current Status: Good posture with use of l/s roll  yes   4. Patient will be able to perform supine to sit transfer with good mechanics & no assistance for safety/support. Status at last Eval: Limited by pain Current Status: Patient able to perform independently, although at times limited by pain yes     New Goals to be achieved in __3-4__  weeks:  1. Improve FOTO score from 44 points to > or = 52 points indicating improved tolerance with ADLs in regards to lower back.    2.  Patient will demonstrate (-) neural tension with SLR on B LE to facilitate driving activities with manageable sx. 3.  Patient will report decreased c/o pain to < or = 2-3/10 to facilitate bed mobility with manageable sx in lower back. 4.  Patient to be independent & compliant with HEP in preparation for D/C.       RECOMMENDATIONS  Patient to continue with PT for up to 3-4 more weeks in order to progress towards achieving all LTGs. If you have any questions/comments please contact us directly at [phone]. Thank you for allowing us to assist in the care of your patient. Therapist Signature: JOE Smith, cert MDT Date: 8/36/1118     Time: 2:07 PM   NOTE TO PHYSICIAN:  PLEASE COMPLETE THE ORDERS BELOW AND FAX TO   [facility]: [phone]. If you are unable to process this request in 24 hours please contact our office: [phone].    ___ I have read the above report and request that my patient continue as recommended.   ___ I have read the above report and request that my patient continue therapy with the following changes/special instructions:_________________________________________________________   ___ I have read the above report and request that my patient be discharged from therapy.      Physician Signature:                                                             Date:                                     Time:                                                                       Marlyn Jones DO

## 2021-07-27 ENCOUNTER — HOSPITAL ENCOUNTER (OUTPATIENT)
Dept: PHYSICAL THERAPY | Age: 61
Discharge: HOME | End: 2021-07-27
Payer: COMMERCIAL

## 2021-07-27 PROCEDURE — 97112 NEUROMUSCULAR REEDUCATION: CPT

## 2021-07-27 PROCEDURE — 97110 THERAPEUTIC EXERCISES: CPT

## 2021-07-27 PROCEDURE — 97014 ELECTRIC STIMULATION THERAPY: CPT

## 2021-07-28 NOTE — PROGRESS NOTES
PHYSICAL THERAPY - DAILY TREATMENT NOTE    Patient Name: Shahnaz Calderon        Date: 2021  : 1960   YES Patient  Verified  Visit #:     Insurance: Payor: Bernie Garduno / Plan: Radha Jane [id number] PPO / Product Type: PPO /      In time: 12:30 P Out time: 1:30 P   Total Treatment Time: 50     BCBS/Medicare Time Tracking (below)   Total Timed Codes (min):  50 1:1 Treatment Time:  50     TREATMENT AREA =  Lower back pain [M54.5]    SUBJECTIVE  Pain Level (on 0 to 10 scale):  5  / 10   Medication Changes/New allergies or changes in medical history, any new surgeries or procedures? NO    If yes, update Summary List   Subjective Functional Status/Changes:  []  No changes reported     Patient will have ROWENA on 21, pain is about the same.   Pain level 5-6/10 at worst.         Modalities Rationale:     decrease pain to improve patient's ability to return to pain-free ADLs  15 min [x] Estim, type/location: IFC to l/s in supine                                      []  att     [x]  unatt     []  w/US     []  w/ice    [x]  w/heat    min []  Mechanical Traction: type/lbs                   []  pro   []  sup   []  int   []  cont    []  before manual    []  after manual    min []  Ultrasound, settings/location:      min []  Iontophoresis w/ dexamethasone, location:                                               []  take home patch       []  in clinic    min []  Ice     []  Heat    location/position:     min []  Vasopneumatic Device, press/temp:    If using vaso (only need to measure limb vaso being performed on)      pre-treatment girth :       post-treatment girth :       measured at (landmark location) :      min []  Other:    [] Skin assessment post-treatment (if applicable):    []  intact    []  redness- no adverse reaction                  []redness  adverse reaction:        20 min Therapeutic Exercise:  [x]  See flow sheet   Rationale:      increase ROM and increase strength to improve the patients ability to return to pain-free bed mobility      15 min Neuromuscular Re-ed: [x]  See flow sheet   Rationale:    increase ROM, increase strength, improve coordination and increase proprioception to improve the patients ability to return to pain-free unsupported sitting       Billed With/As:   [] TE   [] TA   [] Neuro   [] Self Care Patient Education: [x] Review HEP    [] Progressed/Changed HEP based on:   [] positioning   [] body mechanics   [] transfers   [] heat/ice application    [] other:      Other Objective/Functional Measures:    Issued updated HEP    Added supine  LTR today    FOTO no change      Post Treatment Pain Level (on 0 to 10) scale:   3  / 10     ASSESSMENT  Assessment/Changes in Function:     Minimal progress with PT, patient in agreement with being placed on hold & to continue with HEP      []  See Progress Note/Recertification   Patient will continue to benefit from skilled PT services to modify and progress therapeutic interventions, address functional mobility deficits, address ROM deficits, address strength deficits, analyze and address soft tissue restrictions, analyze and cue movement patterns, analyze and modify body mechanics/ergonomics, assess and modify postural abnormalities and instruct in home and community integration to attain remaining goals. Progress toward goals / Updated goals:    Progressing towards LTG 1      PLAN  []  Upgrade activities as tolerated YES Continue plan of care   []  Discharge due to :    [x]  Other: Patient on hold      Therapist: Andra Rose PT    Date: 7/27/2021 Time: 9:17 PM     No future appointments.

## 2021-07-29 ENCOUNTER — APPOINTMENT (OUTPATIENT)
Dept: PHYSICAL THERAPY | Age: 61
End: 2021-07-29
Payer: COMMERCIAL

## 2021-08-03 ENCOUNTER — APPOINTMENT (OUTPATIENT)
Dept: PHYSICAL THERAPY | Age: 61
End: 2021-08-03

## 2021-09-17 NOTE — PROGRESS NOTES
[facility] [facility] PHYSICAL THERAPY AT [address] [place] [address]  [facility], [address] ,[address], [address]  Phone: [phone]  Fax: [phone] FOR PHYSICAL THERAPY          Patient Name: Louise Chin : 1960   Treatment/Medical Diagnosis: Lower back pain [M54.5]   Onset Date: chronic    Referral Source: Jon Rothman DO Start of Care [facility]): 21   Prior Hospitalization: See Medical History Provider #: [id number]   Prior Level of Function: Manageable symptoms with ADLs   Comorbidities: H/o C2-3 fusion 2009, HTN, DM, depression, hearing impairment   Medications: Verified on Patient Summary List   Visits from [facility]: 7 Missed Visits: 0     Goal/Measure of Progress Goal Met? 1. Improve FOTO score from 44 points to > or = 52 points indicating improved tolerance with ADLs in regards to lower back. Status at last Eval: 44 Current Status: No change n/a   2. Patient will demonstrate (-) neural tension with SLR on B LE to facilitate driving activities with manageable sx. Status at last Eval: (+) SLR B LE Current Status: Not assessed  n/a   3. Patient will report decreased c/o pain to < or = 2-3/10 to facilitate bed mobility with manageable sx in lower back. Status at last Eval: Average 4-5/10 Current Status: 5/10  no   4. Patient to be independent & compliant with HEP in preparation for D/C. Status at last Eval: HEP established  Current Status: Good compliance with HEP  yes     Key Functional Changes/Progress: Mr. Anuja Caban was last seen for PT on 21, please see progress note on 21 for progress with PT. Patient was placed on hold, awaiting ROWENA scheduled on 21. No further contact has been made with clinic to continue with PT, therefore patient to be discharged to home program.      Assessments/Recommendations: Other: Self DC, DC to home program    If you have any questions/comments please contact us directly at [phone].    Thank you for allowing us to assist in the care of your patient.     Therapist Signature: JOE Dumont, cert MDT Date: 2-68-41   Reporting Period: 7-22-21 to 7-23-21 Time: 7:45 AM

## 2022-02-08 ENCOUNTER — HOSPITAL ENCOUNTER (OUTPATIENT)
Dept: PHYSICAL THERAPY | Age: 62
Discharge: HOME | End: 2022-02-08
Payer: COMMERCIAL

## 2022-02-08 PROCEDURE — 97110 THERAPEUTIC EXERCISES: CPT

## 2022-02-08 PROCEDURE — 97162 PT EVAL MOD COMPLEX 30 MIN: CPT

## 2022-02-08 NOTE — PROGRESS NOTES
[address] [facility] PHYSICAL THERAPY AT [address] [place] [address]  [facility] [address], [address] ,[address], [address]  Phone: [phone]  Fax: [phone] / [address] [address] PHYSICAL THERAPY SERVICES  Patient Name: Leisa Partida : 1960   Medical   Diagnosis: Other low back pain [M54.59] Treatment Diagnosis: S/p l/s laminectomy   Onset Date: 21     Referral Source: Telma Pollard MD Start of Care [facility]): 2022   Prior Hospitalization: 21 to 21 Provider #: [id number]   Prior Level of Function: Limited with ADLs due to pain   Comorbidities: H/o C2-T3 fusion    Medications: Verified on Patient Summary List   The Plan of Care and following information is based on the information from the initial evaluation.   ==================================================================================  Assessment / key information:  Patient is a pleasant 57 y.o. male who presents to [facility] s/p laminectomy (unsure specifics regarding level of surgery, DOS 21. Current c/o pain are fairly constant in nature in lower back & B LE to level of posterior knees (although sx have decreased in intensity since surgery) & worsen with activities such as prolonged upright activities as well as prolonged sitting & he avoiding lifting per MD recommendation. He continues have difficulty sleeping in bed & spends half of the night sleeping supported in recliner. Sx improve with walking short distances as well as use of use of heat at home. Average reported pain level at 4/10, 6/10 at worst & 2/10 at best.  Upon objective evaluation, patient demonstrates l/s ROM as follows flex 25%, B SB 25%, ext was not assessed today. MMT revealed  Overall B LE strength at 4+/5. Bed mobility such as rolling & sit to stand transfers were limited by pain today. SLR was (+) on B LE for reproduction of B posterior thigh pain.    Patient can benefit PT interventions to improve posture, strength &  decrease pain to facilitate return to unlimited ADLs, work activities & overall functional status.   ==================================================================================  Eval Complexity: History HIGH Complexity :3+ comorbidities / personal factors will impact the outcome/ POC ;  Examination  MEDIUM Complexity : 3 Standardized tests and measures addressing body structure, function, activity limitation and / or participation in recreation ; Presentation MEDIUM Complexity : Evolving with changing characteristics ; Decision Making MEDIUM Complexity : FOTO score of 26-74; Overall Complexity MEDIUM  Problem List: pain affecting function, decrease ROM, decrease strength, impaired gait/ balance, decrease ADL/ functional abilitiies, decrease activity tolerance, decrease flexibility/ joint mobility and decrease transfer abilities   Treatment Plan may include any combination of the following: Therapeutic exercise, Therapeutic activities, Neuromuscular re-education, Physical agent/modality, Gait/balance training, Manual therapy, Aquatic therapy, Patient education, Self Care training, Functional mobility training, Home safety training and Stair training  Patient / Family readiness to learn indicated by: asking questions, trying to perform skills and interest  Persons(s) to be included in education: patient (P)  Barriers to Learning/Limitations: Nonest  Measures taken:    Patient Goal (s): \"strengthen the spine\"   Patient self reported health status: fair  Rehabilitation Potential: fair   Short Term Goals: To be accomplished in  2  weeks:  1) Establish HEP to prevent further disability. 2) Patient will report decreased c/o pain to < or = 3-4/10 to facilitate sleeping at night uninterrupted with manageable sx in l/s. 3) Improve FOTO score from 45 points to > or = 50 points indicating improved tolerance with ADLs in regards to l/s.   4) Patient will be able to demonstrate the appropriate sitting posture with or without use of l/s roll to facilitate sitting activities at home/work.  Long Term Goals: To be accomplished in  4  weeks:  1) Improve FOTO score from 50 points to > or = 56 points indicating improved tolerance with ADLs in regards to l/s. 2) Patient to report 50% improvement in overall function in preparation for return to recreational activities with manageable sx. 3) Patient to report improved sitting tolerance to > or 30 mins to facilitate driving with manageable sx. 4) Patient to be independent & compliant with HEP in preparation for D/C. Frequency / Duration:   Patient to be seen  2-3  times per week for 4  weeks:  Patient / Caregiver education and instruction: self care, activity modification, brace/ splint application and exercises    Therapist Signature: JOE Pérez, cert MDT Date: 7/8/1395   Certification Period: 2-08-22 to 5-06-22 Time: 3:51 PM   =================================================================================  I certify that the above Physical Therapy Services are being furnished while the patient is under my care. I agree with the treatment plan and certify that this therapy is necessary.     Physician Signature:                                                             Date:                                     Time:                                                                       Dewey Meckel, MD

## 2022-02-08 NOTE — PROGRESS NOTES
PHYSICAL THERAPY - DAILY TREATMENT NOTE    Patient Name: Kartik Sanders        Date: 2022  : 1960   YES Patient  Verified  Visit #:     Insurance: Payor: Liang Ratliff / Plan: [place] MEDICARE PART A & B / Product Type: Medicare /      In time: 3:45 P Out time: 4:30 P   Total Treatment Time: 40     BCBS/Medicare Time Tracking (below)   Total Timed Codes (min):  40 1:1 Treatment Time:  40     TREATMENT AREA = Other low back pain [M54.59]    SUBJECTIVE  Pain Level (on 0 to 10 scale):  3  / 10   Medication Changes/New allergies or changes in medical history, any new  surgeries or procedures?     NO    If yes, update Summary List   Subjective Functional Status/Changes:  []  No changes reported     See POC           Modalities Rationale:  Patient deferred   min [] Estim, type/location:                                      []  att     []  unatt     []  w/US     []  w/ice    []  w/heat    min []  Mechanical Traction: type/lbs                   []  pro   []  sup   []  int   []  cont    []  before manual    []  after manual    min []  Ultrasound, settings/location:      min []  Iontophoresis w/ dexamethasone, location:                                               []  take home patch       []  in clinic    min []  Ice     []  Heat    location/position:     min []  Vasopneumatic Device, press/temp:    If using vaso (only need to measure limb vaso being performed on)      pre-treatment girth :       post-treatment girth :       measured at (landmark location) :      min []  Other:    [] Skin assessment post-treatment (if applicable):    []  intact    []  redness- no adverse reaction                  []redness - adverse reaction:        10 min Therapeutic Exercise:  [x]  See flow sheet   Rationale:      increase ROM and increase strength to improve the patients ability to return to pain-free sit to stand transfers     Billed With/As:   [] TE   [] TA   [] Neuro   [] Self Care Patient Education: [x] Review HEP    [] Progressed/Changed HEP based on:   [] positioning   [] body mechanics   [] transfers   [] heat/ice application    [] other:      Other Objective/Functional Measures:    See POC     Post Treatment Pain Level (on 0 to 10) scale:   3  / 10     ASSESSMENT  Assessment/Changes in Function:     See POC      []  See Progress Note/Recertification   Patient will continue to benefit from skilled PT services to modify and progress therapeutic interventions, address functional mobility deficits, address ROM deficits, address strength deficits, analyze and address soft tissue restrictions and assess and modify postural abnormalities to attain remaining goals.    Progress toward goals / Updated goals:    Progressing towards goals established POC     PLAN  []  Upgrade activities as tolerated YES Continue plan of care   []  Discharge due to :    []  Other:      Therapist: Zana Yun PT    Date: 2/8/2022 Time: 4:58 PM     Future Appointments   Date Time Provider Rafy Arce   2/14/2022 11:00 AM Luh Mahoney, PT [facility]   2/16/2022 11:00 AM Dixie Blanco, PT [facility]   2/21/2022  9:30 AM Luh Mahoney, PT [facility]   2/23/2022 11:45 AM Dixie Blanco, PT [facility]   2/28/2022  9:30 AM Luh Mahoney, PT [facility]   3/3/2022  8:00 AM Erik Sharma [place], PT [facility]

## 2022-02-14 ENCOUNTER — HOSPITAL ENCOUNTER (OUTPATIENT)
Dept: PHYSICAL THERAPY | Age: 62
Discharge: HOME | End: 2022-02-14
Payer: COMMERCIAL

## 2022-02-14 PROCEDURE — 97110 THERAPEUTIC EXERCISES: CPT

## 2022-02-14 PROCEDURE — 97112 NEUROMUSCULAR REEDUCATION: CPT

## 2022-02-14 NOTE — PROGRESS NOTES
PHYSICAL THERAPY - DAILY TREATMENT NOTE    Patient Name: Rich Kimball        Date: 2022  : 1960   YES Patient  Verified  Visit #:      of   12  Insurance: Payor: [place] MEDICARE / Plan: VA MEDICARE PART A & B / Product Type: Medicare /      In time: 11:00 A Out time: 11:50 A   Total Treatment Time: 45     BCBS/Medicare Time Tracking (below)   Total Timed Codes (min):  45 1:1 Treatment Time:  45     TREATMENT AREA =  Other low back pain [M54.59]    SUBJECTIVE  Pain Level (on 0 to 10 scale):  3  / 10   Medication Changes/New allergies or changes in medical history, any new surgeries or procedures?     NO    If yes, update Summary List   Subjective Functional Status/Changes:  []  No changes reported     Patient reports no new complaints since eval.           Modalities Rationale:     Patient deferred   min [] Estim, type/location:                                      []  att     []  unatt     []  w/US     []  w/ice    []  w/heat    min []  Mechanical Traction: type/lbs                   []  pro   []  sup   []  int   []  cont    []  before manual    []  after manual    min []  Ultrasound, settings/location:      min []  Iontophoresis w/ dexamethasone, location:                                               []  take home patch       []  in clinic    min []  Ice     []  Heat    location/position:     min []  Vasopneumatic Device, press/temp:    If using vaso (only need to measure limb vaso being performed on)      pre-treatment girth :       post-treatment girth :       measured at (landmark location) :      min []  Other:    [] Skin assessment post-treatment (if applicable):    []  intact    []  redness- no adverse reaction                  []redness - adverse reaction:        20 min Therapeutic Exercise:  [x]  See flow sheet   Rationale:      increase ROM and increase strength to improve the patients ability to return to pain-free standing     25 min Neuromuscular Re-ed: [x]  See flow sheet   Rationale: increase ROM, increase strength, improve balance and increase proprioception to improve the patients ability to return to pain-free unsupported sitting       Billed With/As:   [] TE   [] TA   [] Neuro   [] Self Care Patient Education: [x] Review HEP    [] Progressed/Changed HEP based on:   [] positioning   [] body mechanics   [] transfers   [] heat/ice application    [] other:      Other Objective/Functional Measures:    Initiated exercise per flow sheet      Post Treatment Pain Level (on 0 to 10) scale:   3  / 10     ASSESSMENT  Assessment/Changes in Function:     Good tolerance to current, continues to have difficulty with bed mobility such as rolling & supine to sit transfers, v/c for safety      []  See Progress Note/Recertification   Patient will continue to benefit from skilled PT services to modify and progress therapeutic interventions, address functional mobility deficits, address ROM deficits, address strength deficits, analyze and address soft tissue restrictions, analyze and cue movement patterns, analyze and modify body mechanics/ergonomics, assess and modify postural abnormalities, address imbalance/dizziness and instruct in home and community integration to attain remaining goals.    Progress toward goals / Updated goals:    Progressing towards goals established at [address] [facility] [id number]  []  Upgrade activities as tolerated YES Continue plan of care   []  Discharge due to :    []  Other:      Therapist: Ela Andrews PT    Date: 2/14/2022 Time: 12:58 PM     Future Appointments   Date Time Provider Rafy Arce   2/16/2022 11:00 AM Sandra Matta, PT [facility]   2/21/2022  9:30 AM Tracey Hunt, PT [facility]   2/23/2022 11:45 AM Sandra Matta PT [facility]   2/28/2022  9:30 AM Tracey Hunt, PT [facility]   3/3/2022  8:00 AM Mary Sharma, PT [facility]

## 2022-02-16 ENCOUNTER — HOSPITAL ENCOUNTER (OUTPATIENT)
Dept: PHYSICAL THERAPY | Age: 62
Discharge: HOME | End: 2022-02-16
Payer: COMMERCIAL

## 2022-02-16 PROCEDURE — 97110 THERAPEUTIC EXERCISES: CPT

## 2022-02-16 PROCEDURE — 97112 NEUROMUSCULAR REEDUCATION: CPT

## 2022-02-16 PROCEDURE — 97530 THERAPEUTIC ACTIVITIES: CPT

## 2022-02-16 NOTE — PROGRESS NOTES
PHYSICAL THERAPY - DAILY TREATMENT NOTE    Patient Name: Shahida Duckworth        Date: 2022  : 1960   YES Patient  Verified  Visit #:   3   of   12  Insurance: Payor: Bharat Nones / Plan: [place] MEDICARE PART A & B / Product Type: Medicare /      In time: 1100 Out time: 2869   Total Treatment Time: 45     BCBS/Medicare Time Tracking (below)   Total Timed Codes (min):  45 1:1 Treatment Time:  45     TREATMENT AREA =  Other low back pain [M54.59]    SUBJECTIVE  Pain Level (on 0 to 10 scale):  2  / 10   Medication Changes/New allergies or changes in medical history, any new surgeries or procedures? NO    If yes, update Summary List   Subjective Functional Status/Changes:  []  No changes reported     My pain is getting a little better, pain increases with cleaning the house and vaccuming. Modalities Rationale:     decrease edema, decrease inflammation, decrease pain and increase tissue extensibility to improve patient's ability to perform pain-free ADLs.     min [] Estim, type/location:                                      []  att     []  unatt     []  w/US     []  w/ice    []  w/heat    min []  Mechanical Traction: type/lbs                   []  pro   []  sup   []  int   []  cont    []  before manual    []  after manual    min []  Ultrasound, settings/location:      min []  Iontophoresis w/ dexamethasone, location:                                               []  take home patch       []  in clinic   10 min []  Ice     []  Heat    location/position:     min []  Vasopneumatic Device, press/temp:    If using vaso (only need to measure limb vaso being performed on)      pre-treatment girth :       post-treatment girth :       measured at (landmark location) :      min []  Other:    [] Skin assessment post-treatment (if applicable):    []  intact    []  redness- no adverse reaction                  []redness - adverse reaction:        15 min Therapeutic Exercise:  [x]  See flow sheet   Rationale: increase ROM, increase strength and improve coordination to improve the patients ability to perform unlimited ADLs. 10 min Therapeutic Activity: [x]  See flow sheet  Reviewed log rolling, hip hinge, golfers lift and light ADLs while maintaining a neutral spine   Rationale:    increase ROM, increase strength, improve coordination and increase proprioception to improve the patients ability to transfer without pain    20 min Neuromuscular Re-ed: [x]  See flow sheet   Rationale:    increase ROM, increase strength, improve coordination and increase proprioception to improve the patients ability to improve core stability required for ADLs. Billed With/As:   [] TE   [] TA   [] Neuro   [] Self Care Patient Education: [x] Review HEP    [] Progressed/Changed HEP based on:   [] positioning   [] body mechanics   [] transfers   [] heat/ice application    [] other:      Other Objective/Functional Measures: Added hip hinge and lifting training    Added 90/90 HS stretch with strap and PT assist     Post Treatment Pain Level (on 0 to 10) scale:   2  / 10     ASSESSMENT  Assessment/Changes in Function:     Required max verbal and tactile cues for core stabilization training and hip hinging. Significant restrictions in BL hip ROM caused difficulty with available range during hip hinge. Demonstrated good form with small range golfers lift and leaning forward to lift items from lower surfaces without reports of pain. []  See Progress Note/Recertification   Patient will continue to benefit from skilled PT services to modify and progress therapeutic interventions, address functional mobility deficits, address ROM deficits, address strength deficits, analyze and address soft tissue restrictions, analyze and cue movement patterns, analyze and modify body mechanics/ergonomics and assess and modify postural abnormalities to attain remaining goals.    Progress toward goals / Updated goals:    Progressing towards STG 2 PLAN  []  Upgrade activities as tolerated YES Continue plan of care   []  Discharge due to :    []  Other:      Therapist: Ceasar Haskins DPT    Date: 2/16/2022 Time: 11:07 AM     Future Appointments   Date Time Provider Rafy Arce   2/21/2022  9:30 AM Ken Gomez, PT [facility]   2/23/2022 11:45 AM Jesus Morris, PT [facility]   2/28/2022  9:30 AM Sari Sharma, PT [facility]   3/3/2022  8:00 AM Sari Sharma, PT [facility]     ,d

## 2022-02-21 ENCOUNTER — APPOINTMENT (OUTPATIENT)
Dept: PHYSICAL THERAPY | Age: 62
End: 2022-02-21
Payer: COMMERCIAL

## 2022-02-23 ENCOUNTER — HOSPITAL ENCOUNTER (OUTPATIENT)
Dept: PHYSICAL THERAPY | Age: 62
Discharge: HOME | End: 2022-02-23
Payer: COMMERCIAL

## 2022-02-23 PROCEDURE — 97110 THERAPEUTIC EXERCISES: CPT

## 2022-02-23 PROCEDURE — 97112 NEUROMUSCULAR REEDUCATION: CPT

## 2022-02-23 NOTE — PROGRESS NOTES
PHYSICAL THERAPY - DAILY TREATMENT NOTE    Patient Name: Rich Kimball        Date: 2022  : 1960   YES Patient  Verified  Visit #:     Insurance: Payor: Chrissy Fernandez / Plan: [place] MEDICARE PART A & B / Product Type: Medicare /      In time: 4392 Out time: 1100   Total Treatment Time: 45     BCBS/Medicare Time Tracking (below)   Total Timed Codes (min):  45 1:1 Treatment Time:  45     TREATMENT AREA =  Other low back pain [M54.59]    SUBJECTIVE  Pain Level (on 0 to 10 scale):  2  / 10   Medication Changes/New allergies or changes in medical history, any new surgeries or procedures? NO    If yes, update Summary List   Subjective Functional Status/Changes:  []  No changes reported     My pain is getting a little better, pain increases with cleaning the house and vaccuming. Modalities Rationale:     decrease edema, decrease inflammation, decrease pain and increase tissue extensibility to improve patient's ability to perform pain-free ADLs.     min [] Estim, type/location:                                      []  att     []  unatt     []  w/US     []  w/ice    []  w/heat    min []  Mechanical Traction: type/lbs                   []  pro   []  sup   []  int   []  cont    []  before manual    []  after manual    min []  Ultrasound, settings/location:      min []  Iontophoresis w/ dexamethasone, location:                                               []  take home patch       []  in clinic   PS min []  Ice     []  Heat    location/position:     min []  Vasopneumatic Device, press/temp:    If using vaso (only need to measure limb vaso being performed on)      pre-treatment girth :       post-treatment girth :       measured at (landmark location) :      min []  Other:    [] Skin assessment post-treatment (if applicable):    []  intact    []  redness- no adverse reaction                  []redness - adverse reaction:        20 min Therapeutic Exercise:  [x]  See flow sheet   Rationale: increase ROM, increase strength and improve coordination to improve the patients ability to perform unlimited ADLs. 25 min Neuromuscular Re-ed: [x]  See flow sheet   Rationale:    increase ROM, increase strength, improve coordination and increase proprioception to improve the patients ability to improve core stability required for ADLs. Billed With/As:   [x] TE   [] TA   [x] Neuro   [] Self Care Patient Education: [x] Review HEP    [] Progressed/Changed HEP based on:   [] positioning   [] body mechanics   [] transfers   [] heat/ice application    [] other:      Other Objective/Functional Measures: Added SB 3-way and H/L clam with OTB    Added standing Tband row    Reviewed log rolling     Post Treatment Pain Level (on 0 to 10) scale:   2  / 10     ASSESSMENT  Assessment/Changes in Function:     Pt demonstrated significant improvement in core stabilization abilities today with less compensations and abilty to breathe throughout exercises. Required max cues for seated/standing exercises to maintain core contraction and prevent lumbar extension with movement. []  See Progress Note/Recertification   Patient will continue to benefit from skilled PT services to modify and progress therapeutic interventions, address functional mobility deficits, address ROM deficits, address strength deficits, analyze and address soft tissue restrictions, analyze and cue movement patterns, analyze and modify body mechanics/ergonomics and assess and modify postural abnormalities to attain remaining goals.    Progress toward goals / Updated goals:    Progressing towards STG 2      PLAN  [x]  Upgrade activities as tolerated YES Continue plan of care   []  Discharge due to :    []  Other:      Therapist: Gus Mclaughlin DPT    Date: 2/23/2022 Time: 11:07 AM     Future Appointments   Date Time Provider Rafy Arce   2/28/2022  9:30 AM Yamilex Godfrey, PT [facility]   3/3/2022  8:00 AM Brittni Sharma, PT [facility]     ,d

## 2022-02-28 ENCOUNTER — HOSPITAL ENCOUNTER (OUTPATIENT)
Dept: PHYSICAL THERAPY | Age: 62
Discharge: HOME | End: 2022-02-28
Payer: COMMERCIAL

## 2022-02-28 PROCEDURE — 97112 NEUROMUSCULAR REEDUCATION: CPT

## 2022-02-28 PROCEDURE — 97110 THERAPEUTIC EXERCISES: CPT

## 2022-02-28 NOTE — PROGRESS NOTES
PHYSICAL THERAPY - DAILY TREATMENT NOTE    Patient Name: Shahida Duckworth        Date: 2022  : 1960   YES Patient  Verified  Visit #:     Insurance: Payor: Renato Willoughby / Plan: Radha Jane [id number] PPO / Product Type: PPO /      In time: 9:30 A Out time: 10:25 A   Total Treatment Time: 50     BCBS/Medicare Time Tracking (below)   Total Timed Codes (min):  50 1:1 Treatment Time:  45     TREATMENT AREA =  Other low back pain [M54.59]    SUBJECTIVE  Pain Level (on 0 to 10 scale):  5  / 10   Medication Changes/New allergies or changes in medical history, any new surgeries or procedures? NO    If yes, update Summary List   Subjective Functional Status/Changes:  []  No changes reported     Patient reports he has been having more pain, he has been out of celebrex for the last 2-3 days, he will call PA at [address] [place] [address] to get this refilled.           Modalities Rationale:    Patient deferred   min [] Estim, type/location:                                      []  att     []  unatt     []  w/US     []  w/ice    []  w/heat    min []  Mechanical Traction: type/lbs                   []  pro   []  sup   []  int   []  cont    []  before manual    []  after manual    min []  Ultrasound, settings/location:      min []  Iontophoresis w/ dexamethasone, location:                                               []  take home patch       []  in clinic    min []  Ice     []  Heat    location/position:     min []  Vasopneumatic Device, press/temp:    If using vaso (only need to measure limb vaso being performed on)      pre-treatment girth :       post-treatment girth :       measured at (landmark location) :      min []  Other:    [] Skin assessment post-treatment (if applicable):    []  intact    []  redness- no adverse reaction                  []redness - adverse reaction:        25/  20 min Therapeutic Exercise:  [x]  See flow sheet   Rationale:      increase ROM and increase strength to improve the patients ability to return to pain-free standing     25 min Neuromuscular Re-ed: [x]  See flow sheet   Rationale:    increase ROM, increase strength, improve coordination, improve balance and increase proprioception to improve the patients ability to return to pain-free bed mobility      Billed With/As:   [] TE   [] TA   [] Neuro   [] Self Care Patient Education: [x] Review HEP    [] Progressed/Changed HEP based on:   [] positioning   [] body mechanics   [] transfers   [] heat/ice application    [] other:      Other Objective/Functional Measures: Added book opener stretch with white foam roll     Post Treatment Pain Level (on 0 to 10) scale:   5  / 10     ASSESSMENT  Assessment/Changes in Function:     Difficulty with performing book opener stretch in L S/L position, otherwise good tolerance to today's treatment, no increase in pain      []  See Progress Note/Recertification   Patient will continue to benefit from skilled PT services to modify and progress therapeutic interventions, address functional mobility deficits, address ROM deficits, address strength deficits, analyze and address soft tissue restrictions, analyze and cue movement patterns, analyze and modify body mechanics/ergonomics, assess and modify postural abnormalities, address imbalance/dizziness and instruct in home and community integration to attain remaining goals.    Progress toward goals / Updated goals:    Progressing towards STG 2 & 4     PLAN  []  Upgrade activities as tolerated YES Continue plan of care   []  Discharge due to :    [x]  Other: Re-assess n/v     Therapist: Oli Perera PT    Date: 2/28/2022 Time: 10:23 AM     Future Appointments   Date Time Provider Rafy Arce   3/3/2022  8:00 AM Carlo Sharma, PT [facility]

## 2022-03-03 ENCOUNTER — HOSPITAL ENCOUNTER (OUTPATIENT)
Dept: PHYSICAL THERAPY | Age: 62
Discharge: HOME | End: 2022-03-03
Payer: COMMERCIAL

## 2022-03-03 PROCEDURE — 97112 NEUROMUSCULAR REEDUCATION: CPT

## 2022-03-03 PROCEDURE — 97110 THERAPEUTIC EXERCISES: CPT

## 2022-03-03 NOTE — PROGRESS NOTES
PHYSICAL THERAPY - DAILY TREATMENT NOTE    Patient Name: Ade Aleman        Date: 3/3/2022  : 1960   YES Patient  Verified  Visit #:     Insurance: Payor: Cheryl Fay / Plan: VA MEDICARE PART A & B / Product Type: Medicare /      In time: 8:00 A Out time: 8:45 A   Total Treatment Time: 45     BCBS/Medicare Time Tracking (below)   Total Timed Codes (min):  45 1:1 Treatment Time:  45     TREATMENT AREA =  Other low back pain [M54.59]    SUBJECTIVE  Pain Level (on 0 to 10 scale):  2  / 10   Medication Changes/New allergies or changes in medical history, any new surgeries or procedures?     NO    If yes, update Summary List   Subjective Functional Status/Changes:  []  No changes reported     Patient reports average pain level 2/10, 5/10 at worst,  See DC summary          Modalities Rationale:      min [] Estim, type/location:                                      []  att     []  unatt     []  w/US     []  w/ice    []  w/heat    min []  Mechanical Traction: type/lbs                   []  pro   []  sup   []  int   []  cont    []  before manual    []  after manual    min []  Ultrasound, settings/location:      min []  Iontophoresis w/ dexamethasone, location:                                               []  take home patch       []  in clinic    min []  Ice     []  Heat    location/position:     min []  Vasopneumatic Device, press/temp:    If using vaso (only need to measure limb vaso being performed on)      pre-treatment girth :       post-treatment girth :       measured at (landmark location) :      min []  Other:    [] Skin assessment post-treatment (if applicable):    []  intact    []  redness- no adverse reaction                  []redness - adverse reaction:        25 min Therapeutic Exercise:  [x]  See flow sheet   Rationale:      increase ROM and increase strength to improve the patients ability to return to pain-free standing     20 min Neuromuscular Re-ed: [x]  See flow sheet Rationale:    increase ROM, increase strength, improve balance and increase proprioception to improve the patients ability to return to pain-free unsupported sitting    Billed With/As:   [] TE   [] TA   [] Neuro   [] Self Care Patient Education: [x] Review HEP    [] Progressed/Changed HEP based on:   [] positioning   [] body mechanics   [] transfers   [] heat/ice application    [] other:      Other Objective/Functional Measures:    FOTO 55 points   Post Treatment Pain Level (on 0 to 10) scale:   2 / 10     ASSESSMENT  Assessment/Changes in Function:     Patient is pleased with his progress & feels ready for DC to HEP     []  See Progress Note/Recertification   Patient will continue to benefit from skilled PT services to instruct in home and community integration to attain remaining goals. Progress toward goals / Updated goals:    See DC summary for progress with goals      PLAN  []  Upgrade activities as tolerated YES Continue plan of care   []  Discharge due to :    []  Other:      Therapist: Cate Rodriguez, PT    Date: 3/3/2022 Time: 8:06 AM     No future appointments.

## 2022-03-03 NOTE — PROGRESS NOTES
[address] [place] [address] PHYSICAL THERAPY AT [address] [place] [address] [address]pe [address], [address] ,[address], [address] [facility] [address]  Phone: [phone]  Fax: [phone] FOR PHYSICAL THERAPY          Patient Name: Aura Monreal : 1960   Treatment/Medical Diagnosis: Other low back pain [M54.59]   Onset Date: 21    Referral Source: Dewey Meckel, MD [facility]): 22   Prior Hospitalization: See Medical History Provider #: [id number]   Prior Level of Function: Limited with ADLs due to pain   Comorbidities: H/o C2-T3 fusion    Medications: Verified on Patient Summary List   Visits from [facility]: 6 Missed Visits: 0     Goal/Measure of Progress Goal Met? 1. Patient will report decreased c/o pain to < or = 3-4/10 to facilitate sleeping at night uninterrupted with manageable sx in l/s. Status at last Eval: Average 4/10  At worst 6/10   At best 2/10 Current Status: Average 2/10  At worst 5/10   At best 0/10 Partially met   2. Improve FOTO score from 45 points to > or = 50 points indicating improved tolerance with ADLs in regards to l/s. Status at last Eval: 45 Current Status: 55 yes   3. Patient to report 50% improvement in overall function in preparation for return to recreational activities with manageable sx. Status at last Eval: NA Current Status: 45-50% yes   4. Patient to report improved sitting tolerance to > or 30 mins to facilitate driving with manageable sx. Status at last Eval: Limited by pain Current Status: 30 mins yes     Key Functional Changes/Progress: Mr. Morelia Jensen has made good progress with current treatment, c/o pain is no longer constant in nature, primary c/o pain in lower back that at times radiates into hips, although no longer radiates into posterior thighs. SLR is now (-) on L, mildly (+) on R for reproduction of hip pain. He continues to have difficulty sleeping & sleeping in bed as well as recliner for support at night.   He is pleased with his progress & feels ready for DC to home program, f/u scheduled at the end of the month. Assessments/Recommendations: Discontinue therapy. Progressing towards or have reached established goals. If you have any questions/comments please contact us directly at [phone]. Thank you for allowing us to assist in the care of your patient.     Therapist Signature: JOE Knapp, cert MDT Date: 4-99-11   Reporting Period: 2-08-22 to 3-02-22 Time: 8:15 AM

## 2023-02-03 ENCOUNTER — HOSPITAL ENCOUNTER (OUTPATIENT)
Dept: PHYSICAL THERAPY | Age: 63
Discharge: HOME | End: 2023-02-03
Payer: COMMERCIAL

## 2023-02-03 PROCEDURE — 9990 CHARGE CONVERSION

## 2023-02-03 PROCEDURE — 97110 THERAPEUTIC EXERCISES: CPT | Performed by: PHYSICAL THERAPIST

## 2023-02-03 PROCEDURE — 97110 THERAPEUTIC EXERCISES: CPT

## 2023-02-03 PROCEDURE — 97162 PT EVAL MOD COMPLEX 30 MIN: CPT

## 2023-02-03 PROCEDURE — 97162 PT EVAL MOD COMPLEX 30 MIN: CPT | Performed by: PHYSICAL THERAPIST

## 2023-02-03 NOTE — PROGRESS NOTES
[address] [place] [address] PHYSICAL THERAPY AT [address] [place] [address]  Blair Cantu [address], [address] [place] [address], [address] [facility] [address]  Phone: [phone]  Fax: [phone] / [address] [address] PHYSICAL THERAPY SERVICES  Patient Name: Michael Buck : 1960   Medical   Diagnosis: Back pain [M54.9] Treatment Diagnosis: Back Pain   Onset Date: 2022     Referral Source: Tennis Scriver Start of Care [facility]): 2/3/2023   Prior Hospitalization: See medical history Provider #: [id number]   Prior Level of Function: Persistent R LE pain which limited ability to stand/walk   Comorbidities: H/o cervical fusion, 3 lumbar surgeries in the past year, DM, depression, HTN   Medications: Verified on Patient Summary List   The Plan of Care and following information is based on the information from the initial evaluation.   ===========================================================================================  Assessment / key information:  59 y/o male presents to with central lower back pain. He had surgery in 2021 to treat lumbar radiculopathy. Pt reports symptoms did not improve so he had another surgery in , for a fusion. As his symptoms persisted, he had his most recent decompression surgery in 2022, which he describes as providing some relief of R LE symptoms. Currently he rates his LBP as 3/10. Symptoms are worse when standing/walking or first rising from chair. LE strength was generally 4/5 throughout and any focal weakness. Spinal AROM is limited by pain/stiffness into flexion/extension to ~25% of normal, and sidebending if 75% of normal.  Pt did not get any radicular symptoms with any spinal AROM. Pt has pain when changing positions, but is able to perform all transfers/bed mobility. Core weakness is evident and some pain avoidance during transfers/walking is evident.   Mr Kellen Tidwell has pain, ROM loss, weakness, and limited activity tolerance s/p 2 surgeries in the past year. He would benefit from PT to increase movement/activity tolerance to regain functional independence without pain.   ===========================================================================================  Eval Complexity: History MEDIUM  Complexity : 1-2 comorbidities / personal factors will impact the outcome/ POC ;  Examination  MEDIUM Complexity : 3 Standardized tests and measures addressing body structure, function, activity limitation and / or participation in recreation ; Presentation MEDIUM Complexity : Evolving with changing characteristics ; Decision Making MEDIUM Complexity : FOTO score of 26-74; Overall Complexity MEDIUM  Problem List: pain affecting function, decrease ROM, decrease strength, impaired gait/ balance, decrease ADL/ functional abilitiies, decrease activity tolerance, decrease flexibility/ joint mobility, and decrease transfer abilities   Treatment Plan may include any combination of the following: Therapeutic exercise, Therapeutic activities, Neuromuscular re-education, Physical agent/modality, Gait/balance training, Manual therapy, Dry Needling, Aquatic therapy, Patient education, Self Care training, Functional mobility training, Home safety training and Stair training  Patient / Family readiness to learn indicated by: asking questions, trying to perform skills and interest  Persons(s) to be included in education: patient (P)  Barriers to Learning/Limitations: None  Measures taken:    Patient Goal (s): \"Strength, decrease pain\"   Patient self reported health status: good  Rehabilitation Potential: good  Short Term Goals: To be accomplished in  2  weeks:  Pt independent with basic HEP for hip/spine flexibility in lying. Pt to manage symptoms to </= 3/10 with HEP, postural correction and proper body mechanics. Pt to walk with equal gait components bilaterally and erect posture for 200ft without AD. Long Term Goals:  To be accomplished in  6  weeks:  Pt to increase FOTO score from 42 to >/= 49. Pt independent with high level HEP for dynamic core stabilization, LE endurance, body mechanics, and activity modification. Pt to have painfree functional AROM of lumbar spine to allow painfree ADLs and light housework. Frequency / Duration:   Patient to be seen  2  times per week for 6  weeks:  Patient / Caregiver education and instruction: self care, activity modification, and exercises    Therapist Signature: Norma Cronin PT Date: 1/5/8938   Certification Period: na Time: 7:38 AM   ===========================================================================================  I certify that the above Physical Therapy Services are being furnished while the patient is under my care. I agree with the treatment plan and certify that this therapy is necessary. Physician Signature:        Date:       Time:  _     Viola Rivas PA-C  Please sign and return to [facility] or you may fax the signed copy to [phone]. Thank you.

## 2023-02-03 NOTE — PROGRESS NOTES
PHYSICAL THERAPY - DAILY TREATMENT NOTE    Patient Name: Michael Buck        Date: 2/3/2023  : 1960   YES Patient  Verified  Visit #:      12  Insurance: Payor: Vijaya Nix / Plan: [facility] PPO / Product Type: PPO /      In time: 11:05 Out time: 11:50   Total Treatment Time: 45     BCBS/Medicare Time Tracking (below)   Total Timed Codes (min):  10 1:1 Treatment Time:  10     TREATMENT AREA =  Back pain [M54.9]    SUBJECTIVE  Pain Level (on 0 to 10 scale):  3  / 10   Medication Changes/New allergies or changes in medical history, any new surgeries or procedures?     NO    If yes, update Summary List   Subjective Functional Status/Changes:  [x]  No changes reported     See eval/POC           Modalities Rationale:    NA   min [] Estim, type/location:                                      []  att     []  unatt     []  w/US     []  w/ice    []  w/heat    min []  Mechanical Traction: type/lbs                   []  pro   []  sup   []  int   []  cont    []  before manual    []  after manual    min []  Ultrasound, settings/location:      min []  Iontophoresis w/ dexamethasone, location:                                               []  take home patch       []  in clinic    min []  Ice     []  Heat    location/position:     min []  Vasopneumatic Device, press/temp:     min []  Other:    [] Skin assessment post-treatment (if applicable):    []  intact    []  redness- no adverse reaction     []redness - adverse reaction:        10 min Therapeutic Exercise:  [x]  See flow sheet   Rationale:      increase ROM and improve coordination to improve the patients ability to allow movement without pain       Billed With/As:   [] TE   [] TA   [] Neuro   [] Self Care Patient Education: [x] Review HEP    [] Progressed/Changed HEP based on:   [] positioning   [] body mechanics   [] transfers   [] heat/ice application    [] other:      Other Objective/Functional Measures:    FOTO - 42     Post Treatment Pain Level (on 0 to 10) scale:   3  / 10     ASSESSMENT  Assessment/Changes in Function:     Pt has pain, ROM loss, core weakness, and limited standing/walking tolerance s/p 4th lumbar surgery in the past year. []  See Progress Note/Recertification   Patient will continue to benefit from skilled PT services to modify and progress therapeutic interventions, address functional mobility deficits, address ROM deficits, address strength deficits, analyze and address soft tissue restrictions, analyze and cue movement patterns, analyze and modify body mechanics/ergonomics, and assess and modify postural abnormalities to attain remaining goals.    Progress toward goals / Updated goals:    Goals established today     PLAN  [x]  Upgrade activities as tolerated YES Continue plan of care   []  Discharge due to :    []  Other:      Therapist: Skyla Turpin PT    Date: 2/3/2023 Time: 7:39 AM     Future Appointments   Date Time Provider Rafy Arce   2/3/2023 11:00 AM Nancy Platt PT [place] [facility]

## 2023-02-06 ENCOUNTER — HOSPITAL ENCOUNTER (OUTPATIENT)
Dept: PHYSICAL THERAPY | Age: 63
Discharge: HOME | End: 2023-02-06
Payer: COMMERCIAL

## 2023-02-06 PROCEDURE — 97140 MANUAL THERAPY 1/> REGIONS: CPT

## 2023-02-06 PROCEDURE — 97110 THERAPEUTIC EXERCISES: CPT

## 2023-02-06 PROCEDURE — 9990 CHARGE CONVERSION

## 2023-02-06 NOTE — PROGRESS NOTES
PHYSICAL THERAPY - DAILY TREATMENT NOTE    Patient Name: Rita Stager        Date: 2023  : 1960   yes Patient  Verified  Visit #:      of   12  Insurance: Payor: Terri Stockton / Plan: [facility] PPO / Product Type: PPO /      In time: 147 Out time: 910   Total Treatment Time: 36     Medicare/[facility] Time Tracking (below)   Total Timed Codes (min):  26 1:1 Treatment Time:  26     TREATMENT AREA =  Back pain [M54.9]    SUBJECTIVE  Pain Level (on 0 to 10 scale):  3  / 10   Medication Changes/New allergies or changes in medical history, any new surgeries or procedures?    no  If yes, update Summary List   Subjective Functional Status/Changes:  []  No changes reported     Patient reports the pain in the low back and down in the R leg. Also reports of numbness in the shin          OBJECTIVE  Modalities Rationale:     decrease pain and increase tissue extensibility to improve patient's ability to perform ADLs with decreased pain and improved mobility.    min [] Estim, type/location:                                      []  att     []  unatt     []  w/US     []  w/ice    []  w/heat    min []  Mechanical Traction: type/lbs                   []  pro   []  sup   []  int   []  cont    []  before manual    []  after manual    min []  Ultrasound, settings/location:      min []  Iontophoresis w/ dexamethasone, location:                                               []  take home patch       []  in clinic   10 min []  Ice     [x]  Heat    location/position: Seated lumbar    min []  Vasopneumatic Device, press/temp:    If using vaso (only need to measure limb vaso being performed on)      pre-treatment girth :       post-treatment girth :       measured at (landmark location) :      min []  Other:    [] Skin assessment post-treatment (if applicable):    []  intact    []  redness- no adverse reaction                  []redness - adverse reaction:      16 min Therapeutic Exercise:  [x]  See flow sheet Rationale:      increase ROM, increase strength, improve coordination, improve balance, and increase proprioception to improve the patients ability to perform general ADLs with decrease c/o symptoms and with improved functional performance. 10 min Manual Therapy: R S/L CFM along incision, DTM along lumbar paraspinals   Rationale:      decrease pain, increase ROM, increase tissue extensibility, decrease edema , and decrease trigger points to improve patient's ability to perform general ADLs with decrease c/o symptoms and with improved functional performance. The manual therapy interventions were performed at a separate and distinct time from the therapeutic activities interventions. Billed With/As:   [] TE   [] TA   [] Neuro   [] Self Care Patient Education: [] Review HEP    [] Progressed/Changed HEP based on:   [] positioning   [] body mechanics   [] transfers   [] heat/ice application    [] other:      Other Objective/Functional Measures:    Poor lumbar scar mobility      Post Treatment Pain Level (on 0 to 10) scale:   2 / 10     ASSESSMENT  Assessment/Changes in Function:     Patient tolerated today's treatment very well. Slight TTP along L lumbar paraspinals with slight jump sign. Poor scar mobility in lumbar region. Able to tolerate manual well. Initiated FS exercises today. No sig pain with FS exercises. Tolerated session well without adverse effects      []  See Progress Note/Recertification   Patient will continue to benefit from skilled PT services to modify and progress therapeutic interventions, address functional mobility deficits, address ROM deficits, address strength deficits, analyze and address soft tissue restrictions, analyze and cue movement patterns, analyze and modify body mechanics/ergonomics, assess and modify postural abnormalities, and instruct in home and community integration to attain remaining goals. Progress toward goals / Updated goals:     The is pt first appt since IE. No sig progress made towards goals at this time.       PLAN  [x]  Upgrade activities as tolerated yes Continue plan of care   []  Discharge due to :    []  Other:      Therapist: Tej Pineda PT    Date: 2/6/2023 Time: 8:01 AM     Future Appointments   Date Time Provider Rafy Arce   2/6/2023  8:30 AM Lisa Connor, PT [facility]   2/9/2023 12:00 PM Key [place] PTA [facility]

## 2023-02-09 ENCOUNTER — HOSPITAL ENCOUNTER (OUTPATIENT)
Dept: PHYSICAL THERAPY | Age: 63
Discharge: HOME | End: 2023-02-09
Payer: COMMERCIAL

## 2023-02-09 PROCEDURE — 97014 ELECTRIC STIMULATION THERAPY: CPT

## 2023-02-09 PROCEDURE — 97140 MANUAL THERAPY 1/> REGIONS: CPT

## 2023-02-09 PROCEDURE — 9990 CHARGE CONVERSION

## 2023-02-09 NOTE — PROGRESS NOTES
PHYSICAL THERAPY - DAILY TREATMENT NOTE    Patient Name: Tyrel Albert        Date: 2023  : 1960   yes Patient  Verified  Visit #:   3  of   12  Insurance: Payor: Paulo Nguyen / Plan: [facility] PPO / Product Type: PPO /      In time:  Out time: 1240   Total Treatment Time: 35     Medicare/BCBS Time Tracking (below)   Total Timed Codes (min): 353 1:1 Treatment Time:  35     TREATMENT AREA =  Back pain [M54.9]    SUBJECTIVE  Pain Level (on 0 to 10 scale): 4 / 10   Medication Changes/New allergies or changes in medical history, any new surgeries or procedures?    no  If yes, update Summary List   Subjective Functional Status/Changes:  []  No changes reported     Reports pain is mostly in lower back and does have some pain in (R) quad. OBJECTIVE  Modalities Rationale:     decrease pain and increase tissue extensibility to improve patient's ability to perform ADLs with decreased pain and improved mobility.   10 min [x] Estim, type/location:  Premod; QL and piriformis (B)                                    []  att     []  unatt     []  w/US     []  w/ice    []  w/heat    min []  Mechanical Traction: type/lbs                   []  pro   []  sup   []  int   []  cont    []  before manual    []  after manual    min []  Ultrasound, settings/location:      min []  Iontophoresis w/ dexamethasone, location:                                               []  take home patch       []  in clinic    min []  Ice     []  Heat    location/position:     min []  Vasopneumatic Device, press/temp:    If using vaso (only need to measure limb vaso being performed on)      pre-treatment girth :       post-treatment girth :       measured at (landmark location) :      min []  Other:    [] Skin assessment post-treatment (if applicable):    []  intact    []  redness- no adverse reaction                  []redness - adverse reaction:       min Therapeutic Exercise:  [x]  See flow sheet   Rationale: increase ROM, increase strength, improve coordination, improve balance, and increase proprioception to improve the patients ability to perform general ADLs with decrease c/o symptoms and with improved functional performance. 25 min Manual Therapy: Prone with 2 pillows: CFM along incision, MFR and DTm along T4 - upper glute region, (B) ER PROM, (L) piriformis release. Rationale:      decrease pain, increase ROM, increase tissue extensibility, decrease edema , and decrease trigger points to improve patient's ability to perform general ADLs with decrease c/o symptoms and with improved functional performance. The manual therapy interventions were performed at a separate and distinct time from the therapeutic activities interventions. Billed With/As:   [] TE   [] TA   [] Neuro   [] Self Care Patient Education: [] Review HEP    [] Progressed/Changed HEP based on:   [] positioning   [] body mechanics   [] transfers   [] heat/ice application    [] other:      Other Objective/Functional Measures:    No therx performed today secondary to increase of pain. Significant TTP at (R) piriformis which reproduced ant thigh pain     Post Treatment Pain Level (on 0 to 10) scale:   1  / 10     ASSESSMENT  Assessment/Changes in Function:     Good reduction of overall symptoms as well as (R) thigh pain. Possible involvement of (R) piriformis for thigh pain. []  See Progress Note/Recertification   Patient will continue to benefit from skilled PT services to modify and progress therapeutic interventions, address functional mobility deficits, address ROM deficits, address strength deficits, analyze and address soft tissue restrictions, analyze and cue movement patterns, analyze and modify body mechanics/ergonomics, assess and modify postural abnormalities, and instruct in home and community integration to attain remaining goals. Progress toward goals / Updated goals:    Short Term Goals:  To be accomplished in  2 weeks:  Pt independent with basic HEP for hip/spine flexibility in lying. Pt to manage symptoms to </= 3/10 with HEP, postural correction and proper body mechanics. Pt to walk with equal gait components bilaterally and erect posture for 200ft without AD. Long Term Goals: To be accomplished in  6  weeks:  Pt to increase FOTO score from 42 to >/= 49. Pt independent with high level HEP for dynamic core stabilization, LE endurance, body mechanics, and activity modification. Pt to have painfree functional AROM of lumbar spine to allow painfree ADLs and light housework.      PLAN  [x]  Upgrade activities as tolerated yes Continue plan of care   []  Discharge due to :    []  Other:      Therapist: Mary Lou López PTA    Date: 2/9/2023 Time: 8:01 AM     Future Appointments   Date Time Provider Rafy Arce   2/9/2023 12:00 PM Norbert Rawls [place] [facility]

## 2023-02-16 ENCOUNTER — APPOINTMENT (OUTPATIENT)
Facility: HOSPITAL | Age: 63
End: 2023-02-16
Payer: MEDICARE

## 2023-02-17 ENCOUNTER — HOSPITAL ENCOUNTER (OUTPATIENT)
Facility: HOSPITAL | Age: 63
Setting detail: RECURRING SERIES
Discharge: HOME | End: 2023-02-20
Payer: MEDICARE

## 2023-02-17 PROCEDURE — 97110 THERAPEUTIC EXERCISES: CPT | Performed by: PHYSICAL THERAPIST

## 2023-02-17 PROCEDURE — G0283 ELEC STIM OTHER THAN WOUND: HCPCS | Performed by: PHYSICAL THERAPIST

## 2023-02-17 PROCEDURE — 97140 MANUAL THERAPY 1/> REGIONS: CPT | Performed by: PHYSICAL THERAPIST

## 2023-02-17 NOTE — PROGRESS NOTES
PHYSICAL THERAPY - DAILY TREATMENT NOTE     Patient Name: Efrain Ponce    Date: 2023    : 1960  Insurance: Payor: MEDICARE / Plan: MEDICARE PART A AND B / Product Type: *No Product type* /      Patient  verified Yes     Visit #   Current / Total 4 12   Time   In / Out 8:30 9:20   Pain   In / Out 2 1   Subjective Functional Status/Changes: Pt reports symptoms have been decreasing, but still painful with transfers and distance walking. Changes to:  Meds, Allergies, Med Hx, Sx Hx? If yes, update Summary List no       TREATMENT AREA =  Dorsalgia, unspecified [M54.9]    OBJECTIVE    Modalities Rationale:     decrease pain and increase tissue extensibility to improve patient's ability to progress to PLOF and address remaining functional goals. 15 min [x] Estim Unattended, type/location: IFC to R lumbar area in R sidelying                                     [x]  w/ice    []  w/heat    min [] Estim Attended, type/location:                                     []  w/US     []  w/ice    []  w/heat    []  TENS insruct      min []  Mechanical Traction: type/lbs                   []  pro   []  sup   []  int   []  cont    []  before manual    []  after manual    min []  Ultrasound, settings/location:      min []  Iontophoresis w/ dexamethasone, location:                                               []  take home patch       []  in clinic    min  unbill []  Ice     []  Heat    location/position:     min []  Paraffin,  details:     min []  Vasopneumatic Device, press/temp:     min []  Ana Richard / Bryan Hussain: If using vaso (only need to measure limb vaso being performed on)      pre-treatment girth :       post-treatment girth :       measured at (landmark location) :      min []  Other:    Skin assessment post-treatment (if applicable):    []  intact    []  redness- no adverse reaction                 []redness - adverse reaction:         Therapeutic Procedures:   Tx Min Billable or 1:1 Min (if diff from Tx Min) Procedure, Rationale, Specifics   20 20      Details if applicable:       10 10 45533 Manual Therapy (timed):  decrease pain, increase ROM, increase tissue extensibility, and increase postural awareness to improve patient's ability to progress to PLOF and address remaining functional goals. The manual therapy interventions were performed at a separate and distinct time from the therapeutic activities interventions . (see flow sheet as applicable)     Details if applicable: STM to L QL and L lumbar myofacsia in R side lying followed by manual stretching          Details if applicable:            Details if applicable:            Details if applicable:     30 27 [facility] Totals Reminder: bill using total billable min of TIMED therapeutic procedures (example: do not include dry needle or estim unattended, both untimed codes, in totals to left)  8-22 min = 1 unit; 23-37 min = 2 units; 38-52 min = 3 units; 53-67 min = 4 units; 68-82 min = 5 units   Total Total     [x]  Patient Education billed concurrently with other procedures   [x] Review HEP    [] Progressed/Changed HEP, detail:    [] Other detail:       Objective Information/Functional Measures/Assessment    Added supine march with TA activation and added bridging x10    Pt had good relief of L sided muscle tension/pain with sidelying STM and stretching    Patient will continue to benefit from skilled PT / OT services to modify and progress therapeutic interventions, analyze and address functional mobility deficits, analyze and address ROM deficits, analyze and address strength deficits, analyze and address soft tissue restrictions, analyze and cue for proper movement patterns, analyze and modify for postural abnormalities, and analyze and address imbalance/dizziness to address functional deficits and attain remaining goals.     Progress toward goals / Updated goals:  []  See Progress Note/Recertification    Making slow progress with exercise and movement tolerance, while managing symptoms to 2/10. Norma Bradford     PLAN  Yes  Continue plan of care  []  Upgrade activities as tolerated  []  Discharge due to :  []  Other:    Lilly Macario, PT    2/17/2023    7:36 AM    Future Appointments   Date Time Provider Oliver Cee   2/17/2023  8:30 AM Lilly Macario, PT [facility] [address]   2/21/2023  9:30 AM Elvie Lucero PTA [facility] [address]   2/23/2023  9:30 AM Elvie Lucero, [facility] [address]   2/28/2023  9:30 AM Elvie Lucero, SHRUTI [facility] [address]   3/2/2023  9:30 AM Elvie Lucero PTA [facility] [address]   3/7/2023  9:30 AM Lilly Macario, PT [facility] [address]   3/9/2023  9:30 AM [facility] [address]

## 2023-02-21 ENCOUNTER — HOSPITAL ENCOUNTER (OUTPATIENT)
Facility: HOSPITAL | Age: 63
Setting detail: RECURRING SERIES
Discharge: HOME | End: 2023-02-24
Payer: MEDICARE

## 2023-02-21 PROCEDURE — 97140 MANUAL THERAPY 1/> REGIONS: CPT

## 2023-02-21 PROCEDURE — 97110 THERAPEUTIC EXERCISES: CPT

## 2023-02-21 PROCEDURE — G0283 ELEC STIM OTHER THAN WOUND: HCPCS

## 2023-02-21 NOTE — PROGRESS NOTES
PHYSICAL THERAPY - DAILY TREATMENT NOTE     Patient Name: Alex Noriega    Date: 2023    : 1960  Insurance: Payor: MEDICARE / Plan: MEDICARE PART A AND B / Product Type: *No Product type* /      Patient  verified Yes     Visit #   Current / Total 5 12   Time   In / Out 930 1030   Pain   In / Out 2 2   Subjective Functional Status/Changes: Reports a little pain in (R) LE but seems to be doing food this morning. Possibly a little bit of improvement. Has good and bad days. Changes to:  Meds, Allergies, Med Hx, Sx Hx? If yes, update Summary List no       TREATMENT AREA =  Dorsalgia, unspecified [M54.9]    OBJECTIVE    Modalities Rationale:     decrease pain and increase tissue extensibility to improve patient's ability to progress to PLOF and address remaining functional goals. 15 min [x] Estim Unattended, type/location: Premod; QL and piriformis (B)                                    [x]  w/ice    []  w/heat    min [] Estim Attended, type/location:                                     []  w/US     []  w/ice    []  w/heat    []  TENS insruct      min []  Mechanical Traction: type/lbs                   []  pro   []  sup   []  int   []  cont    []  before manual    []  after manual    min []  Ultrasound, settings/location:      min []  Iontophoresis w/ dexamethasone, location:                                               []  take home patch       []  in clinic    min  unbill []  Ice     []  Heat    location/position:     min []  Paraffin,  details:     min []  Vasopneumatic Device, press/temp:     min []  Esther Styles / Gege Bustillo: If using vaso (only need to measure limb vaso being performed on)      pre-treatment girth :       post-treatment girth :       measured at (landmark location) :      min []  Other:    Skin assessment post-treatment (if applicable):    []  intact    []  redness- no adverse reaction                 []redness - adverse reaction:         Therapeutic Procedures:   Tx Min Billable or 1:1 Min (if diff from Tx Min) Procedure, Rationale, Specifics   20   69139 Therapeutic Exercise (timed):  increase ROM, strength, coordination, balance, and proprioception to improve patient's ability to progress to PLOF and address remaining functional goals. (see flow sheet as applicable)      Details if applicable:       25  63224 Manual Therapy (timed):  decrease pain, increase ROM, increase tissue extensibility, and increase postural awareness to improve patient's ability to progress to PLOF and address remaining functional goals. The manual therapy interventions were performed at a separate and distinct time from the therapeutic activities interventions . (see flow sheet as applicable)     Details if applicable: Prone with 2 pillows: CFM along incision, MFR and DTm along T4 - upper glute region, (B) ER PROM, (L) piriformis release. Details if applicable:            Details if applicable:            Details if applicable:     61  [facility] Totals Reminder: bill using total billable min of TIMED therapeutic procedures (example: do not include dry needle or estim unattended, both untimed codes, in totals to left)  8-22 min = 1 unit; 23-37 min = 2 units; 38-52 min = 3 units; 53-67 min = 4 units; 68-82 min = 5 units   Total Total     [x]  Patient Education billed concurrently with other procedures   [x] Review HEP    [] Progressed/Changed HEP, detail:    [] Other detail:       Objective Information/Functional Measures/Assessment    Decrease tensio noted in (B) upper glute region. Improved ER with PROM of (B) hip motion indicated by decrease restriction. Patient's gait appears less antalgic in nature while ambulating in the clinic.     Patient will continue to benefit from skilled PT / OT services to modify and progress therapeutic interventions, analyze and address functional mobility deficits, analyze and address ROM deficits, analyze and address strength deficits, analyze and address soft tissue restrictions, analyze and cue for proper movement patterns, analyze and modify for postural abnormalities, and analyze and address imbalance/dizziness to address functional deficits and attain remaining goals. Progress toward goals / Updated goals:  []  See Progress Note/Recertification    Short Term Goals: To be accomplished in  2  weeks:  Pt independent with basic HEP for hip/spine flexibility in lying. Pt to manage symptoms to </= 3/10 with HEP, postural correction and proper body mechanics. Slowly progressing 2/21/23  Pt to walk with equal gait components bilaterally and erect posture for 200ft without AD. Slowly progressing 2/21/23  Long Term Goals: To be accomplished in  6  weeks:  Pt to increase FOTO score from 42 to >/= 49. Pt independent with high level HEP for dynamic core stabilization, LE endurance, body mechanics, and activity modification. Pt to have painfree functional AROM of lumbar spine to allow painfree ADLs and light housework.     PLAN  Yes  Continue plan of care  [x]  Upgrade activities as tolerated  []  Discharge due to :  []  Other:    Malena Bobo PTA    2/21/2023    8:38 AM    Future Appointments   Date Time Provider Oliver Cee   2/21/2023  9:30 AM Malena Bobo PTA [facility]   2/23/2023  9:30 AM Malena Bobo PTA [facility]   2/28/2023  9:30 AM Malena Bobo PTA [facility]   3/2/2023  9:30 AM Malena Bobo PTA [facility]   3/7/2023  9:30 AM Javier Love PT [facility]   3/9/2023  9:30 AM [facility]

## 2023-02-23 ENCOUNTER — HOSPITAL ENCOUNTER (OUTPATIENT)
Facility: HOSPITAL | Age: 63
Setting detail: RECURRING SERIES
Discharge: HOME | End: 2023-02-26
Payer: MEDICARE

## 2023-02-23 PROCEDURE — G0283 ELEC STIM OTHER THAN WOUND: HCPCS

## 2023-02-23 PROCEDURE — 97110 THERAPEUTIC EXERCISES: CPT

## 2023-02-23 PROCEDURE — 97140 MANUAL THERAPY 1/> REGIONS: CPT

## 2023-02-23 NOTE — PROGRESS NOTES
PHYSICAL THERAPY - DAILY TREATMENT NOTE     Patient Name: Diana Martell    Date: 2023    : 1960  Insurance: Payor: MEDICARE / Plan: MEDICARE PART A AND B / Product Type: *No Product type* /      Patient  verified Yes     Visit #   Current / Total 6 12   Time   In / Out 930 1030   Pain   In / Out 4 3   Subjective Functional Status/Changes: The leg is doing ok I think, still painful and numbness down into the shin. The back on the left side is really giving me a tough time. Changes to:  Meds, Allergies, Med Hx, Sx Hx? If yes, update Summary List no       TREATMENT AREA =  Dorsalgia, unspecified [M54.9]    OBJECTIVE    Modalities Rationale:     decrease pain and increase tissue extensibility to improve patient's ability to progress to PLOF and address remaining functional goals. 15 min [x] Estim Unattended, type/location: Premod; QL and piriformis (B)                                    [x]  w/ice    []  w/heat    min [] Estim Attended, type/location:                                     []  w/US     []  w/ice    []  w/heat    []  TENS insruct      min []  Mechanical Traction: type/lbs                   []  pro   []  sup   []  int   []  cont    []  before manual    []  after manual    min []  Ultrasound, settings/location:      min []  Iontophoresis w/ dexamethasone, location:                                               []  take home patch       []  in clinic    min  unbill []  Ice     []  Heat    location/position:     min []  Paraffin,  details:     min []  Vasopneumatic Device, press/temp:     min []  Callie Quails / Wealexusn Bays: If using vaso (only need to measure limb vaso being performed on)      pre-treatment girth :       post-treatment girth :       measured at (landmark location) :      min []  Other:    Skin assessment post-treatment (if applicable):    []  intact    []  redness- no adverse reaction                 []redness - adverse reaction:         Therapeutic Procedures:   Tx Min Billable or 1:1 Min (if diff from Tx Min) Procedure, Rationale, Specifics   20   39544 Therapeutic Exercise (timed):  increase ROM, strength, coordination, balance, and proprioception to improve patient's ability to progress to PLOF and address remaining functional goals. (see flow sheet as applicable)      Details if applicable:       25  60038 Manual Therapy (timed):  decrease pain, increase ROM, increase tissue extensibility, and increase postural awareness to improve patient's ability to progress to PLOF and address remaining functional goals. The manual therapy interventions were performed at a separate and distinct time from the therapeutic activities interventions . (see flow sheet as applicable)     Details if applicable: Prone with 2 pillows: CFM along incision, MFR and DTm along T4 - upper glute region, (B) ER PROM, (L) piriformis release. Details if applicable:            Details if applicable:            Details if applicable:     61  [facility] Totals Reminder: bill using total billable min of TIMED therapeutic procedures (example: do not include dry needle or estim unattended, both untimed codes, in totals to left)  8-22 min = 1 unit; 23-37 min = 2 units; 38-52 min = 3 units; 53-67 min = 4 units; 68-82 min = 5 units   Total Total     [x]  Patient Education billed concurrently with other procedures   [x] Review HEP    [] Progressed/Changed HEP, detail:    [] Other detail:       Objective Information/Functional Measures/Assessment    Patient progressing steadily with tolerance to core strengthening and stability therx. Reporting slight reduction of (R) LE symptoms.     Patient will continue to benefit from skilled PT / OT services to modify and progress therapeutic interventions, analyze and address functional mobility deficits, analyze and address ROM deficits, analyze and address strength deficits, analyze and address soft tissue restrictions, analyze and cue for proper movement patterns, analyze and modify for postural abnormalities, and analyze and address imbalance/dizziness to address functional deficits and attain remaining goals. Progress toward goals / Updated goals:  []  See Progress Note/Recertification    Short Term Goals: To be accomplished in  2  weeks:  Pt independent with basic HEP for hip/spine flexibility in lying. Pt to manage symptoms to </= 3/10 with HEP, postural correction and proper body mechanics. Slowly progressing 2/21/23  Pt to walk with equal gait components bilaterally and erect posture for 200ft without AD. Slowly progressing 2/21/23  Long Term Goals: To be accomplished in  6  weeks:  Pt to increase FOTO score from 42 to >/= 49. Pt independent with high level HEP for dynamic core stabilization, LE endurance, body mechanics, and activity modification. Pt to have painfree functional AROM of lumbar spine to allow painfree ADLs and light housework.     PLAN  Yes  Continue plan of care  [x]  Upgrade activities as tolerated  []  Discharge due to :  []  Other:    Jena Krause PTA    2/23/2023    8:42 AM    Future Appointments   Date Time Provider Oliver Cee   2/23/2023  9:30 AM Jena Krause PTA [facility]   2/28/2023  9:30 AM Jena Krause PTA [facility]   3/2/2023  9:30 AM Jena Krause PTA [facility]   3/7/2023  9:30 AM Na Vazquez PT [facility]   3/9/2023  9:30 AM [facility]

## 2023-02-28 ENCOUNTER — HOSPITAL ENCOUNTER (OUTPATIENT)
Facility: HOSPITAL | Age: 63
Setting detail: RECURRING SERIES
Discharge: HOME | End: 2023-03-03
Payer: MEDICARE

## 2023-02-28 PROCEDURE — 97535 SELF CARE MNGMENT TRAINING: CPT

## 2023-02-28 PROCEDURE — G0283 ELEC STIM OTHER THAN WOUND: HCPCS

## 2023-02-28 PROCEDURE — 97110 THERAPEUTIC EXERCISES: CPT

## 2023-02-28 PROCEDURE — 97140 MANUAL THERAPY 1/> REGIONS: CPT

## 2023-02-28 NOTE — THERAPY RECERTIFICATION
[address] [place] [address] PHYSICAL THERAPY  [address], [address], [place], [address] [place] [address] Ph: [phone] Fx: [phone]  PHYSICAL THERAPY PROGRESS NOTE  Patient Name: Remington Kaiser : 1960   Treatment/Medical Diagnosis: Dorsalgia, unspecified [M54.9]   Referral Source: Dawne Goodell     Date of Initial Visit: 2/3/2023 Attended Visits: 7 Missed Visits: 0     SUMMARY OF TREATMENT  Patient has had 7 PT visits which has consisted of therapeutic exercises, neuromuscular re-ed, manual (Prone with 2 pillows: CFM along incision, MFR and DTm along T4 - upper glute region, (B) ER PROM, (L) piriformis release.) and modalities for pain management PRN. CURRENT STATUS    GROC: +2 a little better. Patient reports max pain 5/10, least pain 2/10, average pain 3/10. Patient reports ~ 20% overall improvement with performance of all general ADLs. Patient reports ~ 15% reduction of symptoms. Patient reports sitting tolerance:30 minutes, standing tolerance: 15 minutes, walking tolerance: 10 minutes. Pt demonstrates near equal gait components bilaterally and erect posture for 200ft without AD. Pt to increase FOTO score from 42 to >/= 49. Status at last Eval: 42  Current Status: 44  Goal Met?   progressing    2. Pt independent with high level HEP for dynamic core stabilization, LE endurance, body mechanics, and activity modification. Status at last Eval:   Current Status:   Goal Met? Not at this time. 3. Pt to have painfree functional AROM of lumbar spine to allow painfree ADLs and light housework. Status at last Eval: AROM is limited by pain/stiffness into flexion/extension to ~25% of normal, and sidebending if 75% of normal  Current Status: continues with reduced L/S motion. Goal Met?   progressing      New Goals to be achieved in __4__ weeks  1.  Pt to increase FOTO score from 42 to >/= 49.  2. Pt to have functional AROM of lumbar spine to allow improved ADLs and light housework with pain </= 2/10. 3. Patient to report sitting tolerance:45 minutes, standing tolerance: 30 minutes, walking tolerance: 30 minutes. 4.  Patient to score on GROC +4 (moderately better) to indicate improvement with all general ADLs. RECOMMENDATIONS  We would like to continue with treatment 2x/wk for 4 weeks to progress patient further in functional mobility activities and general ADL performance. Please advise. If you have any questions/comments please contact us directly at [phone]. Thank you for allowing us to assist in the care of your patient. Sukhi SHRUTI Galvez       2/28/2023       10:06 AM       NOTE TO PHYSICIAN:  PLEASE COMPLETE THE ORDERS BELOW AND FAX TO   [facility] Physical Therapy: [phone]  If you are unable to process this request in 24 hours please contact our office: [phone]    ___ I have read the above report and request that my patient continue as recommended.   ___ I have read the above report and request that my patient continue therapy with the following changes/special instructions:_________________________________________________________   ___ I have read the above report and request that my patient be discharged from therapy.      Physician Signature:     Date: ______Time: _____                                 Serafin Walters PA-C

## 2023-02-28 NOTE — PROGRESS NOTES
PHYSICAL THERAPY - DAILY TREATMENT NOTE     Patient Name: Danielle Rocha    Date: 2023    : 1960  Insurance: Payor: MEDICARE / Plan: MEDICARE PART A AND B / Product Type: *No Product type* /      Patient  verified Yes     Visit #   Current / Total 7 12   Time   In / Out 930 1035   Pain   In / Out 4 3   Subjective Functional Status/Changes: Kids came with the grandkids so I was really busy with them. Changes to:  Meds, Allergies, Med Hx, Sx Hx? If yes, update Summary List no       TREATMENT AREA =  Dorsalgia, unspecified [M54.9]    OBJECTIVE    Modalities Rationale:     decrease pain and increase tissue extensibility to improve patient's ability to progress to PLOF and address remaining functional goals. 10 min [x] Estim Unattended, type/location: IFC (L) lumbar and glute. Prone with one pillow. [x]  w/ice    []  w/heat    min [] Estim Attended, type/location:                                     []  w/US     []  w/ice    []  w/heat    []  TENS insruct      min []  Mechanical Traction: type/lbs                   []  pro   []  sup   []  int   []  cont    []  before manual    []  after manual    min []  Ultrasound, settings/location:      min []  Iontophoresis w/ dexamethasone, location:                                               []  take home patch       []  in clinic    min  unbill []  Ice     []  Heat    location/position:     min []  Paraffin,  details:     min []  Vasopneumatic Device, press/temp:     min []  Zulma Muckle / Maggie Pack: If using vaso (only need to measure limb vaso being performed on)      pre-treatment girth :       post-treatment girth :       measured at (landmark location) :      min []  Other:    Skin assessment post-treatment (if applicable):    []  intact    []  redness- no adverse reaction                 []redness - adverse reaction:         Therapeutic Procedures:   Tx Min Billable or 1:1 Min (if diff from [employer]) Procedure, Rationale, Specifics   25   91571 Therapeutic Exercise (timed):  increase ROM, strength, coordination, balance, and proprioception to improve patient's ability to progress to PLOF and address remaining functional goals. (see flow sheet as applicable)      Details if applicable:       15  70768 Manual Therapy (timed):  decrease pain, increase ROM, increase tissue extensibility, and increase postural awareness to improve patient's ability to progress to PLOF and address remaining functional goals. The manual therapy interventions were performed at a separate and distinct time from the therapeutic activities interventions . (see flow sheet as applicable)     Details if applicable: Prone with 2 pillows: CFM along incision, MFR and DTm along T4 - upper glute region, (B) ER PROM, (L) piriformis release. 15   20812 Self Care/Home Management (timed):  improve patient knowledge and understanding of pain reducing techniques, positioning, posture/ergonomics, activity modification, physical therapy expectations, procedures and progression, and reasses goals and functional progress  to improve patient's ability to progress to PLOF and address remaining functional goals. (see flow sheet as applicable)      Details if applicable:            Details if applicable:            Details if applicable:     72  [facility] Totals Reminder: bill using total billable min of TIMED therapeutic procedures (example: do not include dry needle or estim unattended, both untimed codes, in totals to left)  8-22 min = 1 unit; 23-37 min = 2 units; 38-52 min = 3 units; 53-67 min = 4 units; 68-82 min = 5 units   Total Total     [x]  Patient Education billed concurrently with other procedures   [x] Review HEP    [] Progressed/Changed HEP, detail:    [] Other detail:       Objective Information/Functional Measures/Assessment    FOTO: 44     GROC: +2 a little better. Patient reports max pain 5/10, least pain 2/10, average pain 3/10.      Patient reports ~ 20% overall improvement with performance of all general ADLs. Patient reports ~ 15% reduction of symptoms. Patient reports sitting tolerance:30 minutes, standing tolerance: 15 minutes, walking tolerance: 10 minutes. Patient will continue to benefit from skilled PT / OT services to modify and progress therapeutic interventions, analyze and address functional mobility deficits, analyze and address ROM deficits, analyze and address strength deficits, analyze and address soft tissue restrictions, analyze and cue for proper movement patterns, analyze and modify for postural abnormalities, and analyze and address imbalance/dizziness to address functional deficits and attain remaining goals. Progress toward goals / Updated goals:  []  See Progress Note/Recertification    Short Term Goals: To be accomplished in  2  weeks:  Pt independent with basic HEP for hip/spine flexibility in lying. Pt to manage symptoms to </= 3/10 with HEP, postural correction and proper body mechanics. Slowly progressing 2/21/23  Pt to walk with equal gait components bilaterally and erect posture for 200ft without AD. Slowly progressing 2/21/23  Long Term Goals: To be accomplished in  6  weeks:  Pt to increase FOTO score from 42 to >/= 49. Pt independent with high level HEP for dynamic core stabilization, LE endurance, body mechanics, and activity modification. Pt to have painfree functional AROM of lumbar spine to allow painfree ADLs and light housework.     PLAN  Yes  Continue plan of care  [x]  Upgrade activities as tolerated  []  Discharge due to :  []  Other:    Keith Velazquez PTA    2/28/2023    8:44 AM    Future Appointments   Date Time Provider Oliver Cee   2/28/2023  9:30 AM Keith Velazquez PTA [facility]   3/2/2023  9:30 AM Keith Velazquez PTA [facility]   3/7/2023  9:30 AM Nenita Ortiz PT [facility]   3/9/2023  9:30 AM [facility]

## 2023-03-02 ENCOUNTER — HOSPITAL ENCOUNTER (OUTPATIENT)
Facility: HOSPITAL | Age: 63
Setting detail: RECURRING SERIES
Discharge: HOME | End: 2023-03-05
Payer: MEDICARE

## 2023-03-02 PROCEDURE — 97140 MANUAL THERAPY 1/> REGIONS: CPT

## 2023-03-02 PROCEDURE — 97110 THERAPEUTIC EXERCISES: CPT

## 2023-03-02 PROCEDURE — 97112 NEUROMUSCULAR REEDUCATION: CPT

## 2023-03-02 PROCEDURE — G0283 ELEC STIM OTHER THAN WOUND: HCPCS

## 2023-03-07 ENCOUNTER — HOSPITAL ENCOUNTER (OUTPATIENT)
Facility: HOSPITAL | Age: 63
Setting detail: RECURRING SERIES
Discharge: HOME | End: 2023-03-10
Payer: MEDICARE

## 2023-03-07 PROCEDURE — G0283 ELEC STIM OTHER THAN WOUND: HCPCS | Performed by: PHYSICAL THERAPIST

## 2023-03-07 PROCEDURE — 97110 THERAPEUTIC EXERCISES: CPT | Performed by: PHYSICAL THERAPIST

## 2023-03-07 NOTE — PROGRESS NOTES
PHYSICAL THERAPY - DAILY TREATMENT NOTE     Patient Name: Buster Ch    Date: 3/7/2023    : 1960  Insurance: Payor: MEDICARE / Plan: MEDICARE PART A AND B / Product Type: *No Product type* /      Patient  verified Yes     Visit #   Current / Total 8 20   Time   In / Out 9:30 10:15   Pain   In / Out 4 3   Subjective Functional Status/Changes: Pt reports walking daily, but still uses cane when walking outside for stability/balance. Changes to:  Meds, Allergies, Med Hx, Sx Hx? If yes, update Summary List no       TREATMENT AREA =  Dorsalgia, unspecified [M54.9]    OBJECTIVE    Modalities Rationale:     decrease pain and increase tissue extensibility to improve patient's ability to progress to PLOF and address remaining functional goals. 15 min [x] Estim Unattended, type/location: IFC to L lumbar area in R sidelying                                     [x]  w/ice    []  w/heat    min [] Estim Attended, type/location:                                     []  w/US     []  w/ice    []  w/heat    []  TENS insruct      min []  Mechanical Traction: type/lbs                   []  pro   []  sup   []  int   []  cont    []  before manual    []  after manual    min []  Ultrasound, settings/location:      min []  Iontophoresis w/ dexamethasone, location:                                               []  take home patch       []  in clinic    min  unbill []  Ice     []  Heat    location/position:     min []  Paraffin,  details:     min []  Vasopneumatic Device, press/temp:     min []  Matt Nation / Angélica Wilkerson: If using vaso (only need to measure limb vaso being performed on)      pre-treatment girth :       post-treatment girth :       measured at (landmark location) :      min []  Other:    Skin assessment post-treatment (if applicable):    []  intact    []  redness- no adverse reaction                 []redness - adverse reaction:         Therapeutic Procedures:   Tx Min Billable or 1:1 Min (if diff from [employer]) Procedure, Rationale, Specifics   20 15  94194 Therapeutic Exercise (timed):  increase ROM, strength, coordination, balance, and proprioception to improve patient's ability to progress to PLOF and address remaining functional goals. (see flow sheet as applicable)       Details if applicable:       5 5 93023 Manual Therapy (timed):  decrease pain, increase ROM, increase tissue extensibility, and increase postural awareness to improve patient's ability to progress to PLOF and address remaining functional goals. The manual therapy interventions were performed at a separate and distinct time from the therapeutic activities interventions .  (see flow sheet as applicable)     Details if applicable: STM to L QL and L lumbar myofacsia in R side lying followed by gentle manual stretching          Details if applicable:            Details if applicable:            Details if applicable:     30 21 [facility] Totals Reminder: bill using total billable min of TIMED therapeutic procedures (example: do not include dry needle or estim unattended, both untimed codes, in totals to left)  8-22 min = 1 unit; 23-37 min = 2 units; 38-52 min = 3 units; 53-67 min = 4 units; 68-82 min = 5 units   Total Total     [x]  Patient Education billed concurrently with other procedures   [x] Review HEP    [] Progressed/Changed HEP, detail:    [] Other detail:       Objective Information/Functional Measures/Assessment    Less muscle holding in L lumbar myofascia    Notable improved movement tolerance when transferring or when performing bed mobility    Patient will continue to benefit from skilled PT / OT services to modify and progress therapeutic interventions, analyze and address functional mobility deficits, analyze and address ROM deficits, analyze and address strength deficits, analyze and address soft tissue restrictions, analyze and cue for proper movement patterns, analyze and modify for postural abnormalities, and analyze and address imbalance/dizziness to address functional deficits and attain remaining goals.     Progress toward goals / Updated goals:  []  See Progress Note/Recertification    Pt encouraged to trial short duration walking with free arm swing (no SPC) in home to promote muscle relaxation    PLAN  Yes  Continue plan of care  []  Upgrade activities as tolerated  []  Discharge due to :  []  Other:    Hipolito Hull, PT    3/7/2023    7:34 AM    Future Appointments   Date Time Provider Oliver Cee   3/7/2023  9:30 AM Hipolito Hull, PT [facility]   3/9/2023  9:30 AM Aure Smith PTA [facility]

## 2023-03-09 ENCOUNTER — APPOINTMENT (OUTPATIENT)
Facility: HOSPITAL | Age: 63
End: 2023-03-09
Payer: MEDICARE